# Patient Record
Sex: MALE | Race: OTHER | HISPANIC OR LATINO | URBAN - METROPOLITAN AREA
[De-identification: names, ages, dates, MRNs, and addresses within clinical notes are randomized per-mention and may not be internally consistent; named-entity substitution may affect disease eponyms.]

---

## 2017-04-05 ENCOUNTER — OUTPATIENT (OUTPATIENT)
Dept: OUTPATIENT SERVICES | Facility: HOSPITAL | Age: 52
LOS: 1 days | End: 2017-04-05
Payer: SELF-PAY

## 2017-04-05 PROCEDURE — 80061 LIPID PANEL: CPT

## 2017-04-05 PROCEDURE — 84481 FREE ASSAY (FT-3): CPT

## 2017-04-05 PROCEDURE — 82306 VITAMIN D 25 HYDROXY: CPT

## 2017-04-05 PROCEDURE — 87389 HIV-1 AG W/HIV-1&-2 AB AG IA: CPT

## 2017-04-05 PROCEDURE — 81003 URINALYSIS AUTO W/O SCOPE: CPT

## 2017-04-05 PROCEDURE — 80074 ACUTE HEPATITIS PANEL: CPT

## 2017-04-05 PROCEDURE — 86480 TB TEST CELL IMMUN MEASURE: CPT

## 2017-04-05 PROCEDURE — 83036 HEMOGLOBIN GLYCOSYLATED A1C: CPT

## 2017-04-05 PROCEDURE — 85025 COMPLETE CBC W/AUTO DIFF WBC: CPT

## 2017-04-05 PROCEDURE — 84443 ASSAY THYROID STIM HORMONE: CPT

## 2017-04-05 PROCEDURE — 36415 COLL VENOUS BLD VENIPUNCTURE: CPT

## 2017-04-05 PROCEDURE — 80053 COMPREHEN METABOLIC PANEL: CPT

## 2017-04-05 PROCEDURE — 84439 ASSAY OF FREE THYROXINE: CPT

## 2017-04-12 ENCOUNTER — APPOINTMENT (OUTPATIENT)
Dept: FAMILY MEDICINE | Facility: HOSPITAL | Age: 52
End: 2017-04-12

## 2017-04-12 ENCOUNTER — OUTPATIENT (OUTPATIENT)
Dept: OUTPATIENT SERVICES | Facility: HOSPITAL | Age: 52
LOS: 1 days | End: 2017-04-12
Payer: SELF-PAY

## 2017-04-12 VITALS
OXYGEN SATURATION: 97 % | HEART RATE: 68 BPM | BODY MASS INDEX: 22.71 KG/M2 | WEIGHT: 133 LBS | HEIGHT: 64 IN | DIASTOLIC BLOOD PRESSURE: 76 MMHG | RESPIRATION RATE: 14 BRPM | TEMPERATURE: 97.3 F | SYSTOLIC BLOOD PRESSURE: 120 MMHG

## 2017-04-12 PROCEDURE — 82043 UR ALBUMIN QUANTITATIVE: CPT

## 2017-04-12 PROCEDURE — G0463: CPT

## 2017-04-20 ENCOUNTER — FORM ENCOUNTER (OUTPATIENT)
Age: 52
End: 2017-04-20

## 2017-04-21 ENCOUNTER — OUTPATIENT (OUTPATIENT)
Dept: OUTPATIENT SERVICES | Facility: HOSPITAL | Age: 52
LOS: 1 days | End: 2017-04-21
Payer: SELF-PAY

## 2017-04-21 ENCOUNTER — APPOINTMENT (OUTPATIENT)
Dept: FAMILY MEDICINE | Facility: HOSPITAL | Age: 52
End: 2017-04-21

## 2017-04-21 VITALS
DIASTOLIC BLOOD PRESSURE: 75 MMHG | BODY MASS INDEX: 22.71 KG/M2 | SYSTOLIC BLOOD PRESSURE: 126 MMHG | HEART RATE: 75 BPM | TEMPERATURE: 98.1 F | WEIGHT: 133 LBS | HEIGHT: 64 IN | RESPIRATION RATE: 14 BRPM | OXYGEN SATURATION: 97 %

## 2017-04-21 PROCEDURE — 72100 X-RAY EXAM L-S SPINE 2/3 VWS: CPT

## 2017-04-21 PROCEDURE — G0463: CPT

## 2017-04-21 PROCEDURE — 71046 X-RAY EXAM CHEST 2 VIEWS: CPT

## 2017-04-21 PROCEDURE — 93971 EXTREMITY STUDY: CPT

## 2017-04-21 RX ORDER — LEVOTHYROXINE SODIUM 0.1 MG/1
100 TABLET ORAL DAILY
Qty: 60 | Refills: 0 | Status: DISCONTINUED | COMMUNITY
Start: 2017-04-12 | End: 2017-04-21

## 2017-04-25 ENCOUNTER — LABORATORY RESULT (OUTPATIENT)
Age: 52
End: 2017-04-25

## 2017-06-07 ENCOUNTER — CHART COPY (OUTPATIENT)
Age: 52
End: 2017-06-07

## 2017-06-07 ENCOUNTER — RX RENEWAL (OUTPATIENT)
Age: 52
End: 2017-06-07

## 2017-06-07 RX ORDER — METFORMIN HYDROCHLORIDE 500 MG/1
500 TABLET, COATED ORAL DAILY
Qty: 60 | Refills: 0 | Status: DISCONTINUED | COMMUNITY
Start: 2017-04-12 | End: 2017-06-07

## 2017-06-20 ENCOUNTER — APPOINTMENT (OUTPATIENT)
Dept: FAMILY MEDICINE | Facility: HOSPITAL | Age: 52
End: 2017-06-20

## 2017-06-20 LAB
HBA1C MFR BLD HPLC: 15.2
LDLC SERPL DIRECT ASSAY-MCNC: 143
MICROALBUMIN/CREAT 24H UR-RTO: 145

## 2017-08-07 ENCOUNTER — MEDICATION RENEWAL (OUTPATIENT)
Age: 52
End: 2017-08-07

## 2017-08-07 RX ORDER — ATORVASTATIN CALCIUM 10 MG/1
10 TABLET, FILM COATED ORAL
Qty: 30 | Refills: 3 | Status: DISCONTINUED | COMMUNITY
Start: 2017-04-12 | End: 2017-08-07

## 2017-08-16 ENCOUNTER — MEDICATION RENEWAL (OUTPATIENT)
Age: 52
End: 2017-08-16

## 2017-10-31 ENCOUNTER — MEDICATION RENEWAL (OUTPATIENT)
Age: 52
End: 2017-10-31

## 2018-02-24 ENCOUNTER — EMERGENCY (EMERGENCY)
Facility: HOSPITAL | Age: 53
LOS: 1 days | Discharge: HOME | End: 2018-02-24
Admitting: EMERGENCY MEDICINE
Payer: SELF-PAY

## 2018-02-24 PROCEDURE — 99284 EMERGENCY DEPT VISIT MOD MDM: CPT

## 2018-02-24 PROCEDURE — 99283 EMERGENCY DEPT VISIT LOW MDM: CPT | Mod: 25

## 2018-02-24 PROCEDURE — 99053 MED SERV 10PM-8AM 24 HR FAC: CPT

## 2018-03-15 ENCOUNTER — CHART COPY (OUTPATIENT)
Age: 53
End: 2018-03-15

## 2018-03-15 ENCOUNTER — FORM ENCOUNTER (OUTPATIENT)
Age: 53
End: 2018-03-15

## 2018-03-16 ENCOUNTER — OUTPATIENT (OUTPATIENT)
Dept: OUTPATIENT SERVICES | Facility: HOSPITAL | Age: 53
LOS: 1 days | End: 2018-03-16
Payer: SELF-PAY

## 2018-03-16 ENCOUNTER — APPOINTMENT (OUTPATIENT)
Dept: FAMILY MEDICINE | Facility: HOSPITAL | Age: 53
End: 2018-03-16
Payer: SELF-PAY

## 2018-03-16 VITALS
TEMPERATURE: 97.4 F | DIASTOLIC BLOOD PRESSURE: 86 MMHG | RESPIRATION RATE: 14 BRPM | WEIGHT: 126 LBS | SYSTOLIC BLOOD PRESSURE: 121 MMHG | BODY MASS INDEX: 21.63 KG/M2 | HEART RATE: 62 BPM | OXYGEN SATURATION: 100 %

## 2018-03-16 DIAGNOSIS — Z00.00 ENCOUNTER FOR GENERAL ADULT MEDICAL EXAMINATION WITHOUT ABNORMAL FINDINGS: ICD-10-CM

## 2018-03-16 DIAGNOSIS — E11.9 TYPE 2 DIABETES MELLITUS WITHOUT COMPLICATIONS: ICD-10-CM

## 2018-03-16 PROCEDURE — 86480 TB TEST CELL IMMUN MEASURE: CPT

## 2018-03-16 PROCEDURE — 80053 COMPREHEN METABOLIC PANEL: CPT

## 2018-03-16 PROCEDURE — 71046 X-RAY EXAM CHEST 2 VIEWS: CPT

## 2018-03-16 PROCEDURE — 85025 COMPLETE CBC W/AUTO DIFF WBC: CPT

## 2018-03-16 PROCEDURE — 36415 COLL VENOUS BLD VENIPUNCTURE: CPT

## 2018-03-16 PROCEDURE — 84443 ASSAY THYROID STIM HORMONE: CPT

## 2018-03-16 PROCEDURE — 80061 LIPID PANEL: CPT

## 2018-03-16 PROCEDURE — 86140 C-REACTIVE PROTEIN: CPT

## 2018-03-16 PROCEDURE — G0103: CPT

## 2018-03-16 PROCEDURE — 71046 X-RAY EXAM CHEST 2 VIEWS: CPT | Mod: 26

## 2018-03-16 PROCEDURE — G0463: CPT

## 2018-03-16 PROCEDURE — 83036 HEMOGLOBIN GLYCOSYLATED A1C: CPT

## 2018-03-16 PROCEDURE — 82043 UR ALBUMIN QUANTITATIVE: CPT

## 2018-03-20 LAB
HBA1C MFR BLD HPLC: 14.3
LDLC SERPL DIRECT ASSAY-MCNC: 94
MICROALBUMIN/CREAT 24H UR-RTO: 20

## 2018-03-22 ENCOUNTER — APPOINTMENT (OUTPATIENT)
Dept: FAMILY MEDICINE | Facility: HOSPITAL | Age: 53
End: 2018-03-22

## 2018-03-23 ENCOUNTER — MESSAGE (OUTPATIENT)
Age: 53
End: 2018-03-23

## 2018-03-26 ENCOUNTER — APPOINTMENT (OUTPATIENT)
Dept: FAMILY MEDICINE | Facility: HOSPITAL | Age: 53
End: 2018-03-26

## 2018-04-02 ENCOUNTER — OTHER (OUTPATIENT)
Age: 53
End: 2018-04-02

## 2018-04-05 ENCOUNTER — APPOINTMENT (OUTPATIENT)
Dept: PODIATRY | Facility: HOSPITAL | Age: 53
End: 2018-04-05

## 2019-01-17 ENCOUNTER — APPOINTMENT (OUTPATIENT)
Age: 54
End: 2019-01-17

## 2019-01-17 ENCOUNTER — MED ADMIN CHARGE (OUTPATIENT)
Age: 54
End: 2019-01-17

## 2019-01-17 ENCOUNTER — OUTPATIENT (OUTPATIENT)
Dept: OUTPATIENT SERVICES | Facility: HOSPITAL | Age: 54
LOS: 1 days | End: 2019-01-17
Payer: SELF-PAY

## 2019-01-17 ENCOUNTER — RESULT CHARGE (OUTPATIENT)
Age: 54
End: 2019-01-17

## 2019-01-17 VITALS
OXYGEN SATURATION: 99 % | DIASTOLIC BLOOD PRESSURE: 50 MMHG | BODY MASS INDEX: 21.46 KG/M2 | SYSTOLIC BLOOD PRESSURE: 91 MMHG | HEART RATE: 68 BPM | TEMPERATURE: 97.4 F | WEIGHT: 125 LBS | RESPIRATION RATE: 15 BRPM

## 2019-01-17 DIAGNOSIS — Z00.00 ENCOUNTER FOR GENERAL ADULT MEDICAL EXAMINATION WITHOUT ABNORMAL FINDINGS: ICD-10-CM

## 2019-01-17 LAB — HBA1C MFR BLD HPLC: 14

## 2019-01-17 PROCEDURE — G0463: CPT

## 2019-01-17 PROCEDURE — G0008: CPT

## 2019-01-17 RX ORDER — ATORVASTATIN CALCIUM 10 MG/1
10 TABLET, FILM COATED ORAL
Qty: 90 | Refills: 3 | Status: DISCONTINUED | COMMUNITY
Start: 2017-08-07 | End: 2019-01-17

## 2019-01-17 RX ORDER — METFORMIN HYDROCHLORIDE 500 MG/1
500 TABLET, COATED ORAL
Qty: 120 | Refills: 1 | Status: DISCONTINUED | COMMUNITY
Start: 2017-06-07 | End: 2019-01-17

## 2019-01-17 RX ORDER — LISINOPRIL 2.5 MG/1
2.5 TABLET ORAL DAILY
Qty: 60 | Refills: 0 | Status: DISCONTINUED | COMMUNITY
Start: 2017-04-21 | End: 2019-01-17

## 2019-01-17 RX ORDER — ASPIRIN ENTERIC COATED TABLETS 81 MG 81 MG/1
81 TABLET, DELAYED RELEASE ORAL DAILY
Qty: 1 | Refills: 1 | Status: DISCONTINUED | COMMUNITY
Start: 2017-04-21 | End: 2019-01-17

## 2019-01-17 RX ORDER — IBUPROFEN 200 MG/1
200 TABLET, FILM COATED ORAL EVERY 8 HOURS
Qty: 24 | Refills: 0 | Status: DISCONTINUED | COMMUNITY
Start: 2017-04-12 | End: 2019-01-17

## 2019-01-17 NOTE — ASSESSMENT
[FreeTextEntry1] : 53M comes in for f/u DM.\par \par 1. Uncontrolled DM\par POCT Hba1c\par f/u umicro and lipids\par PPSV vaccine given\par Referred pt to ophthalmology, podiatry, and DM educator\par Sent lisinopril, metformin, and atorvastatin to pharmacy and provided Good Rx discount cards\par \par 2. Varicose veins\par Prescribed compression stockings\par \par 3. Health maintenance\par Flu vaccine given\par f/u CBC, CMP\par Will give FIT testing at next visit\par \par 4. Depression\par Scored 17 on PHQ 9 - will refer to SW at next visit\par He is not acutely suicidal, told him if he develops these feelings he must call 911 immediately.\par \par 5. Poor vision\par Snellen test showed he is 20/200 with both eyes individually and together\par Referred to ophthalmology\par \par RTC in 1 week for f/u DM and depression\par \par Discussed w/ Dr. Green

## 2019-01-17 NOTE — COUNSELING
[Weight management counseling provided] : Weight management [Healthy eating counseling provided] : healthy eating [Disease Management counseling provided] : disease management  [Walking] : Walking [Decrease Portions] : Decrease food portions [Take medications as directed] : Take medications as directed [Check sugar ___ times per week] : Check blood sugar [unfilled]  times per week [Check feet daily] : Check feet daily [Keep FS  log to bring to next visit] : Keep finger stick log and  bring  to next visit

## 2019-01-17 NOTE — HISTORY OF PRESENT ILLNESS
[Diabetes Mellitus] : Diabetes Mellitus [Patient was last seen on ___] : Patient was last seen on [unfilled] [[employer] ] : provided by [employer]   [# of episodes ___] : Reports [unfilled] hypoglycemic episodes since the last visit. [Does not check] : Patient does not check blood glucose regularly [Most Recent A1C: ___] : Most recent A1C was [unfilled] [Target goal not met] : A1C target goal not met [Neuropathy] :  neuropathy [FreeTextEntry6] : 53M comes in for f/u DM. Last A1c was 14.3 March 2018. Repeat POCT Hba1c today showed. Doesn't check his sugars at home. He says the doctors previously told him to use insulin but he isn't using it and instead is using natural products (capsules?). Has poor appetite and reports he is losing weight. He says he never used insulin. He isn't taking metformin either because he is taking "the natural product". He also reports he has not been taking any of the other prescribed PO meds because he can't afford them. He said he would take them if he could afford them.\par \par Diet: salads, non-fatty foods, avoiding sweets [FreeTextEntry1] : [id number] [de-identified] : Has had numbness/tingling of feet for ~1 year.

## 2019-01-17 NOTE — PHYSICAL EXAM
[No Acute Distress] : no acute distress [Well Nourished] : well nourished [Well Developed] : well developed [Well-Appearing] : well-appearing [No JVD] : no jugular venous distention [Supple] : supple [No Lymphadenopathy] : no lymphadenopathy [Thyroid Normal, No Nodules] : the thyroid was normal and there were no nodules present [No Respiratory Distress] : no respiratory distress  [Clear to Auscultation] : lungs were clear to auscultation bilaterally [Normal Rate] : normal rate  [Regular Rhythm] : with a regular rhythm [Normal S1, S2] : normal S1 and S2 [No Murmur] : no murmur heard [Pedal Pulses Present] : the pedal pulses are present [No Joint Swelling] : no joint swelling [Normal Gait] : normal gait [Coordination Grossly Intact] : coordination grossly intact [Right Foot Was Examined] : Right foot ~C was examined [Left Foot Was Examined] : left foot ~C was examined [None] : no ulcers in either foot were found [] : both feet [___] : left foot points [unfilled] [de-identified] : + mole in between 1st and 2nd toe LLE

## 2019-01-18 ENCOUNTER — EMERGENCY (EMERGENCY)
Facility: HOSPITAL | Age: 54
LOS: 1 days | Discharge: HOME | End: 2019-01-18
Attending: INTERNAL MEDICINE | Admitting: INTERNAL MEDICINE
Payer: MEDICAID

## 2019-01-18 ENCOUNTER — OUTPATIENT (OUTPATIENT)
Dept: OUTPATIENT SERVICES | Facility: HOSPITAL | Age: 54
LOS: 1 days | End: 2019-01-18
Payer: SELF-PAY

## 2019-01-18 ENCOUNTER — APPOINTMENT (OUTPATIENT)
Age: 54
End: 2019-01-18

## 2019-01-18 VITALS
TEMPERATURE: 98 F | SYSTOLIC BLOOD PRESSURE: 100 MMHG | DIASTOLIC BLOOD PRESSURE: 60 MMHG | HEART RATE: 60 BPM | RESPIRATION RATE: 17 BRPM | OXYGEN SATURATION: 96 %

## 2019-01-18 VITALS
OXYGEN SATURATION: 100 % | SYSTOLIC BLOOD PRESSURE: 100 MMHG | WEIGHT: 123.9 LBS | RESPIRATION RATE: 17 BRPM | DIASTOLIC BLOOD PRESSURE: 60 MMHG | HEIGHT: 64.57 IN | TEMPERATURE: 98 F | HEART RATE: 76 BPM

## 2019-01-18 VITALS
TEMPERATURE: 97.6 F | WEIGHT: 130 LBS | SYSTOLIC BLOOD PRESSURE: 89 MMHG | OXYGEN SATURATION: 100 % | HEART RATE: 56 BPM | RESPIRATION RATE: 15 BRPM | BODY MASS INDEX: 22.31 KG/M2 | DIASTOLIC BLOOD PRESSURE: 57 MMHG

## 2019-01-18 DIAGNOSIS — E11.65 TYPE 2 DIABETES MELLITUS WITH HYPERGLYCEMIA: ICD-10-CM

## 2019-01-18 DIAGNOSIS — Z00.00 ENCOUNTER FOR GENERAL ADULT MEDICAL EXAMINATION WITHOUT ABNORMAL FINDINGS: ICD-10-CM

## 2019-01-18 DIAGNOSIS — Z23 ENCOUNTER FOR IMMUNIZATION: ICD-10-CM

## 2019-01-18 DIAGNOSIS — R73.9 HYPERGLYCEMIA, UNSPECIFIED: ICD-10-CM

## 2019-01-18 LAB
ACETONE SERPL-MCNC: NEGATIVE — SIGNIFICANT CHANGE UP
ALBUMIN SERPL ELPH-MCNC: 3.3 G/DL — SIGNIFICANT CHANGE UP (ref 3.3–5)
ALBUMIN SERPL ELPH-MCNC: 4.3 G/DL
ALP BLD-CCNC: 71 U/L
ALP SERPL-CCNC: 76 U/L — SIGNIFICANT CHANGE UP (ref 40–120)
ALT FLD-CCNC: 16 U/L DA — SIGNIFICANT CHANGE UP (ref 10–45)
ALT SERPL-CCNC: 14 U/L
ANION GAP SERPL CALC-SCNC: 14 MMOL/L
ANION GAP SERPL CALC-SCNC: 8 MMOL/L — SIGNIFICANT CHANGE UP (ref 5–17)
APTT BLD: 27.6 SEC — SIGNIFICANT CHANGE UP (ref 27.5–36.3)
AST SERPL-CCNC: 11 U/L — SIGNIFICANT CHANGE UP (ref 10–40)
AST SERPL-CCNC: 16 U/L
BASOPHILS # BLD AUTO: 0 K/UL — SIGNIFICANT CHANGE UP (ref 0–0.2)
BASOPHILS # BLD AUTO: 0.02 K/UL
BASOPHILS NFR BLD AUTO: 0.5 %
BASOPHILS NFR BLD AUTO: 0.8 % — SIGNIFICANT CHANGE UP (ref 0–2)
BILIRUB SERPL-MCNC: 0.6 MG/DL
BILIRUB SERPL-MCNC: 0.8 MG/DL — SIGNIFICANT CHANGE UP (ref 0.2–1.2)
BUN SERPL-MCNC: 13 MG/DL
BUN SERPL-MCNC: 13 MG/DL — SIGNIFICANT CHANGE UP (ref 7–23)
CALCIUM SERPL-MCNC: 8.3 MG/DL — LOW (ref 8.4–10.5)
CALCIUM SERPL-MCNC: 9.6 MG/DL
CHLORIDE SERPL-SCNC: 95 MMOL/L
CHLORIDE SERPL-SCNC: 99 MMOL/L — SIGNIFICANT CHANGE UP (ref 96–108)
CHOLEST SERPL-MCNC: 226 MG/DL
CHOLEST/HDLC SERPL: 4.8 RATIO
CO2 BLDCOV-SCNC: 10 MMOL/L — LOW (ref 22–30)
CO2 SERPL-SCNC: 24 MMOL/L
CO2 SERPL-SCNC: 27 MMOL/L — SIGNIFICANT CHANGE UP (ref 22–31)
CREAT SERPL-MCNC: 0.78 MG/DL — SIGNIFICANT CHANGE UP (ref 0.5–1.3)
CREAT SERPL-MCNC: 0.84 MG/DL
CREAT SPEC-SCNC: 35 MG/DL
EOSINOPHIL # BLD AUTO: 0.1 K/UL — SIGNIFICANT CHANGE UP (ref 0–0.5)
EOSINOPHIL # BLD AUTO: 0.22 K/UL
EOSINOPHIL NFR BLD AUTO: 3 % — SIGNIFICANT CHANGE UP (ref 0–6)
EOSINOPHIL NFR BLD AUTO: 5.3 %
GAS PNL BLDCOV: 7.36 — SIGNIFICANT CHANGE UP (ref 7.25–7.45)
GLUCOSE BLDC GLUCOMTR-MCNC: 299 MG/DL — HIGH (ref 70–99)
GLUCOSE BLDC GLUCOMTR-MCNC: 338 MG/DL — HIGH (ref 70–99)
GLUCOSE BLDC GLUCOMTR-MCNC: 346 MG/DL — HIGH (ref 70–99)
GLUCOSE SERPL-MCNC: 405 MG/DL — HIGH (ref 70–99)
GLUCOSE SERPL-MCNC: 502 MG/DL
HCT VFR BLD CALC: 42.4 % — SIGNIFICANT CHANGE UP (ref 39–50)
HCT VFR BLD CALC: 44 %
HDLC SERPL-MCNC: 47 MG/DL
HGB BLD-MCNC: 14.9 G/DL — SIGNIFICANT CHANGE UP (ref 13–17)
HGB BLD-MCNC: 15.2 G/DL
IMM GRANULOCYTES NFR BLD AUTO: 0 %
INR BLD: 1.03 RATIO — SIGNIFICANT CHANGE UP (ref 0.88–1.16)
LDLC SERPL CALC-MCNC: 129 MG/DL
LYMPHOCYTES # BLD AUTO: 1.1 K/UL — SIGNIFICANT CHANGE UP (ref 1–3.3)
LYMPHOCYTES # BLD AUTO: 1.75 K/UL
LYMPHOCYTES # BLD AUTO: 21.9 % — SIGNIFICANT CHANGE UP (ref 13–44)
LYMPHOCYTES NFR BLD AUTO: 42.1 %
MAN DIFF?: NORMAL
MCHC RBC-ENTMCNC: 30.9 PG
MCHC RBC-ENTMCNC: 31.4 PG — SIGNIFICANT CHANGE UP (ref 27–34)
MCHC RBC-ENTMCNC: 34.5 GM/DL
MCHC RBC-ENTMCNC: 35.3 GM/DL — SIGNIFICANT CHANGE UP (ref 32–36)
MCV RBC AUTO: 89.2 FL — SIGNIFICANT CHANGE UP (ref 80–100)
MCV RBC AUTO: 89.4 FL
MICROALBUMIN 24H UR DL<=1MG/L-MCNC: <1.2 MG/DL
MICROALBUMIN/CREAT 24H UR-RTO: NORMAL
MONOCYTES # BLD AUTO: 0.3 K/UL
MONOCYTES # BLD AUTO: 0.4 K/UL — SIGNIFICANT CHANGE UP (ref 0–0.9)
MONOCYTES NFR BLD AUTO: 7.2 %
MONOCYTES NFR BLD AUTO: 8.3 % — SIGNIFICANT CHANGE UP (ref 1–9)
NEUTROPHILS # BLD AUTO: 1.87 K/UL
NEUTROPHILS # BLD AUTO: 3.2 K/UL — SIGNIFICANT CHANGE UP (ref 1.8–7.4)
NEUTROPHILS NFR BLD AUTO: 44.9 %
NEUTROPHILS NFR BLD AUTO: 66 % — SIGNIFICANT CHANGE UP (ref 43–77)
PCO2 BLDCOV: 18 MMHG — LOW (ref 27–49)
PLATELET # BLD AUTO: 179 K/UL — SIGNIFICANT CHANGE UP (ref 150–400)
PLATELET # BLD AUTO: 229 K/UL
PO2 BLDCOA: 55 MMHG — HIGH (ref 17–41)
POTASSIUM SERPL-MCNC: 4 MMOL/L — SIGNIFICANT CHANGE UP (ref 3.5–5.3)
POTASSIUM SERPL-SCNC: 4 MMOL/L — SIGNIFICANT CHANGE UP (ref 3.5–5.3)
POTASSIUM SERPL-SCNC: 4.7 MMOL/L
PROT SERPL-MCNC: 6.3 G/DL — SIGNIFICANT CHANGE UP (ref 6–8.3)
PROT SERPL-MCNC: 7.1 G/DL
PROTHROM AB SERPL-ACNC: 11.5 SEC — SIGNIFICANT CHANGE UP (ref 10–12.9)
RBC # BLD: 4.75 M/UL — SIGNIFICANT CHANGE UP (ref 4.2–5.8)
RBC # BLD: 4.92 M/UL
RBC # FLD: 10.8 % — SIGNIFICANT CHANGE UP (ref 10.3–14.5)
RBC # FLD: 12.7 %
SAO2 % BLDCOV: 89 % — HIGH (ref 20–75)
SODIUM SERPL-SCNC: 133 MMOL/L
SODIUM SERPL-SCNC: 134 MMOL/L — LOW (ref 135–145)
TRIGL SERPL-MCNC: 248 MG/DL
WBC # BLD: 4.9 K/UL — SIGNIFICANT CHANGE UP (ref 3.8–10.5)
WBC # FLD AUTO: 4.16 K/UL
WBC # FLD AUTO: 4.9 K/UL — SIGNIFICANT CHANGE UP (ref 3.8–10.5)

## 2019-01-18 PROCEDURE — 80053 COMPREHEN METABOLIC PANEL: CPT

## 2019-01-18 PROCEDURE — 93005 ELECTROCARDIOGRAM TRACING: CPT

## 2019-01-18 PROCEDURE — 71045 X-RAY EXAM CHEST 1 VIEW: CPT | Mod: 26

## 2019-01-18 PROCEDURE — 85027 COMPLETE CBC AUTOMATED: CPT

## 2019-01-18 PROCEDURE — 85610 PROTHROMBIN TIME: CPT

## 2019-01-18 PROCEDURE — 96361 HYDRATE IV INFUSION ADD-ON: CPT

## 2019-01-18 PROCEDURE — 96374 THER/PROPH/DIAG INJ IV PUSH: CPT

## 2019-01-18 PROCEDURE — G0008: CPT

## 2019-01-18 PROCEDURE — 82009 KETONE BODYS QUAL: CPT

## 2019-01-18 PROCEDURE — 82803 BLOOD GASES ANY COMBINATION: CPT

## 2019-01-18 PROCEDURE — 82043 UR ALBUMIN QUANTITATIVE: CPT

## 2019-01-18 PROCEDURE — 99284 EMERGENCY DEPT VISIT MOD MDM: CPT | Mod: 25

## 2019-01-18 PROCEDURE — G0463: CPT

## 2019-01-18 PROCEDURE — 71045 X-RAY EXAM CHEST 1 VIEW: CPT

## 2019-01-18 PROCEDURE — 82962 GLUCOSE BLOOD TEST: CPT

## 2019-01-18 PROCEDURE — 93010 ELECTROCARDIOGRAM REPORT: CPT

## 2019-01-18 PROCEDURE — 80061 LIPID PANEL: CPT

## 2019-01-18 PROCEDURE — 85730 THROMBOPLASTIN TIME PARTIAL: CPT

## 2019-01-18 PROCEDURE — 99284 EMERGENCY DEPT VISIT MOD MDM: CPT

## 2019-01-18 RX ORDER — INSULIN HUMAN 100 [IU]/ML
6 INJECTION, SOLUTION SUBCUTANEOUS ONCE
Qty: 0 | Refills: 0 | Status: COMPLETED | OUTPATIENT
Start: 2019-01-18 | End: 2019-01-18

## 2019-01-18 RX ORDER — SODIUM CHLORIDE 9 MG/ML
1000 INJECTION INTRAMUSCULAR; INTRAVENOUS; SUBCUTANEOUS ONCE
Qty: 0 | Refills: 0 | Status: COMPLETED | OUTPATIENT
Start: 2019-01-18 | End: 2019-01-18

## 2019-01-18 RX ORDER — SODIUM CHLORIDE 9 MG/ML
3 INJECTION INTRAMUSCULAR; INTRAVENOUS; SUBCUTANEOUS ONCE
Qty: 0 | Refills: 0 | Status: COMPLETED | OUTPATIENT
Start: 2019-01-18 | End: 2019-01-18

## 2019-01-18 RX ORDER — SODIUM CHLORIDE 9 MG/ML
1000 INJECTION INTRAMUSCULAR; INTRAVENOUS; SUBCUTANEOUS
Qty: 0 | Refills: 0 | Status: DISCONTINUED | OUTPATIENT
Start: 2019-01-18 | End: 2019-01-22

## 2019-01-18 RX ADMIN — SODIUM CHLORIDE 1000 MILLILITER(S): 9 INJECTION INTRAMUSCULAR; INTRAVENOUS; SUBCUTANEOUS at 12:21

## 2019-01-18 RX ADMIN — SODIUM CHLORIDE 3 MILLILITER(S): 9 INJECTION INTRAMUSCULAR; INTRAVENOUS; SUBCUTANEOUS at 11:28

## 2019-01-18 RX ADMIN — INSULIN HUMAN 6 UNIT(S): 100 INJECTION, SOLUTION SUBCUTANEOUS at 13:15

## 2019-01-18 RX ADMIN — SODIUM CHLORIDE 1000 MILLILITER(S): 9 INJECTION INTRAMUSCULAR; INTRAVENOUS; SUBCUTANEOUS at 11:31

## 2019-01-18 RX ADMIN — SODIUM CHLORIDE 1000 MILLILITER(S): 9 INJECTION INTRAMUSCULAR; INTRAVENOUS; SUBCUTANEOUS at 13:33

## 2019-01-18 RX ADMIN — SODIUM CHLORIDE 1000 MILLILITER(S): 9 INJECTION INTRAMUSCULAR; INTRAVENOUS; SUBCUTANEOUS at 12:31

## 2019-01-18 NOTE — ED PROVIDER NOTE - PHYSICAL EXAMINATION
General:     NAD, well-nourished, well-appearing  Head:     NC/AT, EOMI, oral mucosa dry  Neck:     trachea midline  Lungs:     CTA b/l, no w/r/r  CVS:     S1S2, RRR, no m/g/r  Abd:     +BS, s/nt/nd, no organomegaly  Ext:    2+ radial and pedal pulses, no c/c/e  Neuro: AAOx3, no sensory/motor deficits

## 2019-01-18 NOTE — HISTORY OF PRESENT ILLNESS
[FreeTextEntry8] : 54 YO M with PMHx of DM2, uncontrolled, presenting post ER visit for hyperglycemia of >500.  patient received 6 units of insulin and was discharged from the ED once his blood glucose was <300.  Patient would like to start taking insulin.  He is concerned about his disease process. \par  [[employer] ] : provided by [employer]   [FreeTextEntry1] : [id number]

## 2019-01-18 NOTE — PROVIDER CONTACT NOTE (OTHER) - ACTION/TREATMENT ORDERED:
Dr Castro aware of latest BS.  He contacted Family Practice , who will come to bedside and have a provider educate him regarding insulin usage/Diabetes
Dr Castro to continue to monitor
As ordered infuse 2L NS, and then repeat BS, no further interventions at this time

## 2019-01-18 NOTE — ED ADULT NURSE NOTE - OBJECTIVE STATEMENT
As per pt, went to Doctor, 1/17/19, and had blood drawn, after c/o weakness.  received call this am that glucose in BS was "high", and advised to go to ED

## 2019-01-18 NOTE — ASSESSMENT
[FreeTextEntry1] : #DM2\par - A1C: 14.0 (1/17/19)\par - Patient was sent to the ED for glucose >500.\par - Patient is taking Metformin which was started yesterday\par - Would like to start insulin\par - Novolin 70/30, syringes, needles sent to [facility] with coupons\par - Patient to follow up with Diabetic Educator on Tuesday 1/22/19\par - Patient to get glucose meter from Diabetic Educator\par - Patient has poor vision but is able to reach close\par - Avoid hyperglycemic foods\par - Drink lots of water\par - Go to ED if: headaches, visual changes, polyuria, confusion.\par \par RTC in 1 week for DM2 follow up\par \par D/W Dr. El

## 2019-01-22 ENCOUNTER — APPOINTMENT (OUTPATIENT)
Dept: FAMILY MEDICINE | Facility: HOSPITAL | Age: 54
End: 2019-01-22

## 2019-01-23 DIAGNOSIS — E11.9 TYPE 2 DIABETES MELLITUS WITHOUT COMPLICATIONS: ICD-10-CM

## 2019-01-25 ENCOUNTER — EMERGENCY (EMERGENCY)
Facility: HOSPITAL | Age: 54
LOS: 1 days | Discharge: HOME | End: 2019-01-25
Attending: EMERGENCY MEDICINE | Admitting: EMERGENCY MEDICINE
Payer: MEDICAID

## 2019-01-25 ENCOUNTER — APPOINTMENT (OUTPATIENT)
Age: 54
End: 2019-01-25

## 2019-01-25 ENCOUNTER — RESULT CHARGE (OUTPATIENT)
Age: 54
End: 2019-01-25

## 2019-01-25 ENCOUNTER — OUTPATIENT (OUTPATIENT)
Dept: OUTPATIENT SERVICES | Facility: HOSPITAL | Age: 54
LOS: 1 days | End: 2019-01-25
Payer: SELF-PAY

## 2019-01-25 VITALS
HEART RATE: 59 BPM | OXYGEN SATURATION: 98 % | RESPIRATION RATE: 16 BRPM | DIASTOLIC BLOOD PRESSURE: 64 MMHG | SYSTOLIC BLOOD PRESSURE: 110 MMHG

## 2019-01-25 VITALS
OXYGEN SATURATION: 100 % | HEART RATE: 57 BPM | SYSTOLIC BLOOD PRESSURE: 108 MMHG | RESPIRATION RATE: 18 BRPM | DIASTOLIC BLOOD PRESSURE: 70 MMHG | HEIGHT: 64 IN | TEMPERATURE: 97 F | WEIGHT: 115.08 LBS

## 2019-01-25 DIAGNOSIS — Z00.00 ENCOUNTER FOR GENERAL ADULT MEDICAL EXAMINATION WITHOUT ABNORMAL FINDINGS: ICD-10-CM

## 2019-01-25 PROBLEM — E78.00 PURE HYPERCHOLESTEROLEMIA, UNSPECIFIED: Chronic | Status: ACTIVE | Noted: 2019-01-18

## 2019-01-25 LAB — GLUCOSE BLDC GLUCOMTR-MCNC: 310 MG/DL — HIGH (ref 70–99)

## 2019-01-25 PROCEDURE — 99283 EMERGENCY DEPT VISIT LOW MDM: CPT

## 2019-01-25 PROCEDURE — 82043 UR ALBUMIN QUANTITATIVE: CPT

## 2019-01-25 PROCEDURE — G0463: CPT

## 2019-01-25 PROCEDURE — 82962 GLUCOSE BLOOD TEST: CPT

## 2019-01-25 PROCEDURE — 82274 ASSAY TEST FOR BLOOD FECAL: CPT
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
## 2019-01-25 NOTE — ED PROVIDER NOTE - NSFOLLOWUPINSTRUCTIONS_ED_ALL_ED_FT
Follow up with the FP clinic 24-48 hours.  Take all of your medications as prescribed  Any worsening of symptoms or new concerning symptoms return to the ED

## 2019-01-25 NOTE — ED ADULT NURSE NOTE - NSIMPLEMENTINTERV_GEN_ALL_ED
Implemented All Universal Safety Interventions:  [place] to call system. Call bell, personal items and telephone within reach. Instruct patient to call for assistance. Room bathroom lighting operational. Non-slip footwear when patient is off stretcher. Physically safe environment: no spills, clutter or unnecessary equipment. Stretcher in lowest position, wheels locked, appropriate side rails in place.

## 2019-01-25 NOTE — ED ADULT NURSE NOTE - OBJECTIVE STATEMENT
Pt presents A&Ox3 with c/c of feeling depressed on and off x few weeks.   States also has been having extremely elevated blood sugar lately, has been treated recently in this ED for it.   went to Family Practice to have his diabetes routinely evaluated but while was being assessed and questioned about feeling depressed/suicidal he admitted to have been feeling depressed on and off with suicidal ideation, denies being depressed or feeling suicidal today.   Today denies dizziness, SOB, CP, n/v/d/c.  Pt is calm and compliant.  Placed in consult room, undressed, clothing collected and placed in storage room.

## 2019-01-25 NOTE — ED PROVIDER NOTE - ATTENDING CONTRIBUTION TO CARE
Keegan with [facility]. The patient is denying suicidality. I discussed with resident who said that he said he was suicidal while there. If the patient is denying such thoughts at this time and is future oriented (says he has appointment next week and wants to improve his DM control), there is no need for psychiatric evaluation emergently. Plan to check FS. Apparently his A1C is 13%. Patient made aware of sx for return to ED prn. I performed a face to face bedside interview with patient regarding history of present illness, review of symptoms and past medical history. I completed an independent physical exam.  I have discussed the patient's plan of care with Physician Assistant (PA). I agree with note as stated above, having amended the EMR as needed to reflect my findings.   This includes History of Present Illness, HIV, Past Medical/Surgical/Family/Social History, Allergies and Home Medications, Review of Systems, Physical Exam, and any Progress Notes during the time I functioned as the attending physician for this patient.

## 2019-01-25 NOTE — PLAN
[FreeTextEntry1] : - showed him how to inject insulin\par - gave him glucometer and test strips\par - gave him 1/3 HBV vaccine--> will recall him in 1 month for 2/3\par - again gave him ophthalmology, podiatry and diabetic educator referral \par - will refer the patient to the ED for further management of severe depression.
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
## 2019-01-25 NOTE — ED PROVIDER NOTE - PHYSICAL EXAMINATION
General:     NAD, well-nourished, well-appearing  Eyes: PERRL  Head:     NC/AT, EOMI, oral mucosa moist  Neck:     trachea midline  Lungs:     CTA b/l  CVS:     RRR  Abd:     +BS, s/nt/nd  Ext:   no deformities   Neuro: AAOx3, no sensory/motor deficits

## 2019-01-25 NOTE — ED ADULT NURSE REASSESSMENT NOTE - NS ED NURSE REASSESS COMMENT FT1
1:1 was verbally d/c'd by provider but delayed d/c in [place] because provider had an emergency.   Pt did not need to be on 1:1 as pt denied SI on arrival to ED.

## 2019-01-25 NOTE — PHYSICAL EXAM
[No Acute Distress] : no acute distress [Cachectic] : cachexia was observed [Normal Sclera/Conjunctiva] : normal sclera/conjunctiva [PERRL] : pupils equal round and reactive to light [Normal Outer Ear/Nose] : the outer ears and nose were normal in appearance [No JVD] : no jugular venous distention [No Respiratory Distress] : no respiratory distress  [Clear to Auscultation] : lungs were clear to auscultation bilaterally [No Accessory Muscle Use] : no accessory muscle use [Normal Rate] : normal rate  [Regular Rhythm] : with a regular rhythm [Normal S1, S2] : normal S1 and S2 [No Edema] : there was no peripheral edema [Soft] : abdomen soft [Non Tender] : non-tender [Non-distended] : non-distended [No CVA Tenderness] : no CVA  tenderness [No Joint Swelling] : no joint swelling [No Rash] : no rash [Normal Gait] : normal gait [de-identified] : p

## 2019-01-25 NOTE — ED PROVIDER NOTE - MEDICAL DECISION MAKING DETAILS
pt known diabetic with hypoglycemia. no suicidal ideations. has future plans and f/u car scheduled. will f/u FP clinic
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
## 2019-01-28 DIAGNOSIS — E11.65 TYPE 2 DIABETES MELLITUS WITH HYPERGLYCEMIA: ICD-10-CM

## 2019-02-01 ENCOUNTER — RESULT CHARGE (OUTPATIENT)
Age: 54
End: 2019-02-01

## 2019-02-01 ENCOUNTER — APPOINTMENT (OUTPATIENT)
Age: 54
End: 2019-02-01

## 2019-02-01 ENCOUNTER — OUTPATIENT (OUTPATIENT)
Dept: OUTPATIENT SERVICES | Facility: HOSPITAL | Age: 54
LOS: 1 days | End: 2019-02-01
Payer: SELF-PAY

## 2019-02-01 ENCOUNTER — OTHER (OUTPATIENT)
Age: 54
End: 2019-02-01

## 2019-02-01 VITALS
BODY MASS INDEX: 21.85 KG/M2 | RESPIRATION RATE: 16 BRPM | HEART RATE: 65 BPM | HEIGHT: 64 IN | OXYGEN SATURATION: 100 % | DIASTOLIC BLOOD PRESSURE: 63 MMHG | SYSTOLIC BLOOD PRESSURE: 98 MMHG | WEIGHT: 128 LBS | TEMPERATURE: 98 F

## 2019-02-01 DIAGNOSIS — Z00.00 ENCOUNTER FOR GENERAL ADULT MEDICAL EXAMINATION WITHOUT ABNORMAL FINDINGS: ICD-10-CM

## 2019-02-01 PROCEDURE — G0463: CPT

## 2019-02-01 NOTE — HISTORY OF PRESENT ILLNESS
[FreeTextEntry1] : DM Check up [de-identified] : Patient recently seen for DM check, A1C 14.3, was instructed to take insulin, was educated as to how he can administer. Patient has hx of non-compliance and severe depression. Today patient returns for glucose check. Case dw Lily Shahid, patient re-educated, teachback as to how he uses his medicines, and his diet and exercise regimen. Patient is eating well, using insulin twice a day, BGM in the room showed 147 and 155. Patient is motivated, no longer feeling depressed today. Patient with financial issues to pay for medications at the pharmacy, will given him GoodRx coupons as possible.

## 2019-02-04 DIAGNOSIS — E11.65 TYPE 2 DIABETES MELLITUS WITH HYPERGLYCEMIA: ICD-10-CM

## 2019-02-05 LAB — GLUCOSE BLDC GLUCOMTR-MCNC: 143

## 2019-02-11 LAB
CREAT SPEC-SCNC: 94 MG/DL
HBA1C MFR BLD HPLC: 14
HEMOCCULT STL QL IA: NEGATIVE
MICROALBUMIN 24H UR DL<=1MG/L-MCNC: <1.2 MG/DL
MICROALBUMIN/CREAT 24H UR-RTO: NORMAL

## 2019-02-15 ENCOUNTER — OUTPATIENT (OUTPATIENT)
Dept: OUTPATIENT SERVICES | Facility: HOSPITAL | Age: 54
LOS: 1 days | End: 2019-02-15
Payer: SELF-PAY

## 2019-02-15 ENCOUNTER — APPOINTMENT (OUTPATIENT)
Age: 54
End: 2019-02-15

## 2019-02-15 ENCOUNTER — OUTPATIENT (OUTPATIENT)
Dept: OUTPATIENT SERVICES | Facility: HOSPITAL | Age: 54
LOS: 1 days | End: 2019-02-15

## 2019-02-15 VITALS
WEIGHT: 133 LBS | OXYGEN SATURATION: 99 % | BODY MASS INDEX: 22.83 KG/M2 | DIASTOLIC BLOOD PRESSURE: 60 MMHG | HEART RATE: 61 BPM | SYSTOLIC BLOOD PRESSURE: 94 MMHG | RESPIRATION RATE: 15 BRPM | TEMPERATURE: 97.2 F

## 2019-02-15 DIAGNOSIS — E11.65 TYPE 2 DIABETES MELLITUS WITH HYPERGLYCEMIA: ICD-10-CM

## 2019-02-15 DIAGNOSIS — Z00.00 ENCOUNTER FOR GENERAL ADULT MEDICAL EXAMINATION WITHOUT ABNORMAL FINDINGS: ICD-10-CM

## 2019-02-15 PROCEDURE — G0108: CPT

## 2019-02-15 PROCEDURE — G0463: CPT

## 2019-02-15 NOTE — PLAN
[FreeTextEntry1] : # will start patient on citalopram 10mg- will refer him to psychiatry at [facility] with suicidal ideation-  Should he feel overwhelmed-  advised him to go immediately to the ED.\par -will follow up in 2 weeks ( since started Escitalopram)\par will continue Metformin 1 g bid  and Novolin 10 U bid\par Have started him on Atorvastatin \par

## 2019-02-15 NOTE — ASSESSMENT
[FreeTextEntry1] : 53y M  past medical history of poorly controlled diabetes. Takes 10 units of Novolin in the morning and 10 units at night. Is depressed since learning that he may have to use insulin for his disease.

## 2019-02-15 NOTE — HISTORY OF PRESENT ILLNESS
[de-identified] : 53y M  past medical history of poorly controlled diabetes. Takes 10 units of Novolin in the morning and 10 units at night. Reports checking BGMs twice a day. Reports range of 145 in the morning and 140 at night. Denies symptoms of hypoglycemia. Has been compliant with medications and diet. Gets up to urinate 3 times at night, but denies polyuria. Denies nausea/vomiting or changes with appetite. Pt reports feeling anxious and overwhelmed with constant puncturing of his fingers/skin and feels depressed. PHQ-9 18/30.

## 2019-02-15 NOTE — PHYSICAL EXAM
[No Acute Distress] : no acute distress [Normal Voice/Communication] : normal voice/communication [Cachectic] : cachexia was observed [Normal Sclera/Conjunctiva] : normal sclera/conjunctiva [PERRL] : pupils equal round and reactive to light [Normal Outer Ear/Nose] : the outer ears and nose were normal in appearance [Normal Oropharynx] : the oropharynx was normal [No JVD] : no jugular venous distention [No Lymphadenopathy] : no lymphadenopathy [No Respiratory Distress] : no respiratory distress  [Clear to Auscultation] : lungs were clear to auscultation bilaterally [No Accessory Muscle Use] : no accessory muscle use [Normal Rate] : normal rate  [Regular Rhythm] : with a regular rhythm [Normal S1, S2] : normal S1 and S2 [No Abdominal Bruit] : a ~M bruit was not heard ~T in the abdomen [No Edema] : there was no peripheral edema [Soft] : abdomen soft [Non Tender] : non-tender [Non-distended] : non-distended [No Masses] : no abdominal mass palpated [No HSM] : no HSM [Normal Posterior Cervical Nodes] : no posterior cervical lymphadenopathy [Normal Anterior Cervical Nodes] : no anterior cervical lymphadenopathy [No CVA Tenderness] : no CVA  tenderness [No Joint Swelling] : no joint swelling [No Rash] : no rash [Normal Gait] : normal gait

## 2019-02-18 DIAGNOSIS — E11.65 TYPE 2 DIABETES MELLITUS WITH HYPERGLYCEMIA: ICD-10-CM

## 2019-02-19 DIAGNOSIS — F32.9 MAJOR DEPRESSIVE DISORDER, SINGLE EPISODE, UNSPECIFIED: ICD-10-CM

## 2019-02-19 DIAGNOSIS — E11.9 TYPE 2 DIABETES MELLITUS WITHOUT COMPLICATIONS: ICD-10-CM

## 2019-03-01 ENCOUNTER — APPOINTMENT (OUTPATIENT)
Age: 54
End: 2019-03-01

## 2019-03-07 ENCOUNTER — APPOINTMENT (OUTPATIENT)
Dept: PODIATRY | Facility: HOSPITAL | Age: 54
End: 2019-03-07

## 2019-03-26 ENCOUNTER — RX RENEWAL (OUTPATIENT)
Age: 54
End: 2019-03-26

## 2019-04-24 ENCOUNTER — RX RENEWAL (OUTPATIENT)
Age: 54
End: 2019-04-24

## 2019-05-13 ENCOUNTER — RESULT CHARGE (OUTPATIENT)
Age: 54
End: 2019-05-13

## 2019-05-13 ENCOUNTER — APPOINTMENT (OUTPATIENT)
Dept: FAMILY MEDICINE | Facility: HOSPITAL | Age: 54
End: 2019-05-13

## 2019-05-13 ENCOUNTER — OUTPATIENT (OUTPATIENT)
Dept: OUTPATIENT SERVICES | Facility: HOSPITAL | Age: 54
LOS: 1 days | End: 2019-05-13
Payer: SELF-PAY

## 2019-05-13 VITALS
HEART RATE: 61 BPM | SYSTOLIC BLOOD PRESSURE: 127 MMHG | BODY MASS INDEX: 22.31 KG/M2 | RESPIRATION RATE: 16 BRPM | WEIGHT: 130 LBS | TEMPERATURE: 98.2 F | OXYGEN SATURATION: 100 % | DIASTOLIC BLOOD PRESSURE: 77 MMHG

## 2019-05-13 DIAGNOSIS — Z00.00 ENCOUNTER FOR GENERAL ADULT MEDICAL EXAMINATION WITHOUT ABNORMAL FINDINGS: ICD-10-CM

## 2019-05-13 LAB
GLUCOSE BLDC GLUCOMTR-MCNC: 160
HBA1C MFR BLD HPLC: 6.5

## 2019-05-13 PROCEDURE — G0463: CPT

## 2019-05-13 RX ORDER — HUMAN INSULIN 100 [IU]/ML
(70-30) 100 INJECTION, SUSPENSION SUBCUTANEOUS TWICE DAILY
Qty: 1 | Refills: 3 | Status: DISCONTINUED | COMMUNITY
Start: 2019-01-18 | End: 2019-05-13

## 2019-05-13 RX ORDER — ESCITALOPRAM OXALATE 10 MG/1
10 TABLET ORAL
Qty: 30 | Refills: 3 | Status: DISCONTINUED | COMMUNITY
Start: 2019-02-15 | End: 2019-05-13

## 2019-05-13 RX ORDER — LISINOPRIL 2.5 MG/1
2.5 TABLET ORAL DAILY
Qty: 90 | Refills: 2 | Status: DISCONTINUED | COMMUNITY
Start: 2019-01-17 | End: 2019-05-13

## 2019-05-13 RX ORDER — BLOOD SUGAR DIAGNOSTIC
30G X 5/16" STRIP MISCELLANEOUS
Qty: 1 | Refills: 2 | Status: DISCONTINUED | COMMUNITY
Start: 2019-01-18 | End: 2019-05-13

## 2019-05-13 RX ORDER — ATORVASTATIN CALCIUM 40 MG/1
40 TABLET, FILM COATED ORAL
Qty: 90 | Refills: 1 | Status: DISCONTINUED | COMMUNITY
Start: 2019-01-17 | End: 2019-05-13

## 2019-05-13 NOTE — PHYSICAL EXAM
[No Acute Distress] : no acute distress [Well Nourished] : well nourished [No JVD] : no jugular venous distention [No Respiratory Distress] : no respiratory distress  [Clear to Auscultation] : lungs were clear to auscultation bilaterally [No Accessory Muscle Use] : no accessory muscle use [Normal Rate] : normal rate  [Regular Rhythm] : with a regular rhythm [Normal S1, S2] : normal S1 and S2 [No Murmur] : no murmur heard [Pedal Pulses Present] : the pedal pulses are present [No Edema] : there was no peripheral edema [Soft] : abdomen soft [Non Tender] : non-tender [Normal Bowel Sounds] : normal bowel sounds [No Rash] : no rash [Normal Gait] : normal gait [de-identified] : Bilateral lower extremity varicose veins.

## 2019-05-13 NOTE — HISTORY OF PRESENT ILLNESS
[FreeTextEntry8] : 2 weeks, gradual onset pain. Described as a pins and needles pain in his back, and bilateral lower extremities. Mild in intensity. No exacerbating or alleviating factors. Pain is constant. Reports he stopped taking his insulin 2 weeks ago. Last A1C was 14% in January. Has not been checking blood sugar because he reports he is nervous. Reports he is adherent with his metformin. \par \par Reports he has cost issues with his medications. \par

## 2019-05-13 NOTE — ASSESSMENT
[FreeTextEntry1] : #Neuropathic pain likely secondary to DM2\par -Use tylenol as needed for pain\par -Follow-up in 2 weeks to consider using gabapentin or other similar agent\par \par #DM2\par -Stop insulin given POCT A1C 6.5\par -Fignerstick 160\par -Start glipizide XL 2.5 mg QD\par -Check blood sugar if hypoglycemic \par -Follow-up 2 weeks\par -Will hold lisinopril for now\par -Simvastatin instead of atorvastatin due to cost concerns\par \par #Depression\par -Change escitalopram to fluoxetine due to cost concerns

## 2019-05-14 DIAGNOSIS — E11.65 TYPE 2 DIABETES MELLITUS WITH HYPERGLYCEMIA: ICD-10-CM

## 2019-05-29 ENCOUNTER — APPOINTMENT (OUTPATIENT)
Dept: FAMILY MEDICINE | Facility: HOSPITAL | Age: 54
End: 2019-05-29

## 2019-07-08 ENCOUNTER — MEDICATION RENEWAL (OUTPATIENT)
Age: 54
End: 2019-07-08

## 2019-09-16 ENCOUNTER — EMERGENCY (EMERGENCY)
Facility: HOSPITAL | Age: 54
LOS: 1 days | Discharge: HOME | End: 2019-09-16
Attending: EMERGENCY MEDICINE | Admitting: EMERGENCY MEDICINE
Payer: MEDICAID

## 2019-09-16 VITALS
RESPIRATION RATE: 18 BRPM | DIASTOLIC BLOOD PRESSURE: 68 MMHG | HEART RATE: 57 BPM | SYSTOLIC BLOOD PRESSURE: 102 MMHG | TEMPERATURE: 98 F | OXYGEN SATURATION: 97 %

## 2019-09-16 VITALS
OXYGEN SATURATION: 97 % | TEMPERATURE: 98 F | HEIGHT: 64.57 IN | HEART RATE: 62 BPM | WEIGHT: 139.99 LBS | DIASTOLIC BLOOD PRESSURE: 73 MMHG | RESPIRATION RATE: 18 BRPM | SYSTOLIC BLOOD PRESSURE: 119 MMHG

## 2019-09-16 LAB
ALBUMIN SERPL ELPH-MCNC: 3.7 G/DL — SIGNIFICANT CHANGE UP (ref 3.3–5)
ALP SERPL-CCNC: 74 U/L — SIGNIFICANT CHANGE UP (ref 40–120)
ALT FLD-CCNC: 28 U/L — SIGNIFICANT CHANGE UP (ref 10–45)
ANION GAP SERPL CALC-SCNC: 8 MMOL/L — SIGNIFICANT CHANGE UP (ref 5–17)
APPEARANCE UR: CLEAR — SIGNIFICANT CHANGE UP
APTT BLD: 30 SEC — SIGNIFICANT CHANGE UP (ref 27.5–36.3)
AST SERPL-CCNC: 15 U/L — SIGNIFICANT CHANGE UP (ref 10–40)
BASOPHILS # BLD AUTO: 0.04 K/UL — SIGNIFICANT CHANGE UP (ref 0–0.2)
BASOPHILS NFR BLD AUTO: 0.7 % — SIGNIFICANT CHANGE UP (ref 0–2)
BILIRUB SERPL-MCNC: 0.7 MG/DL — SIGNIFICANT CHANGE UP (ref 0.2–1.2)
BILIRUB UR-MCNC: NEGATIVE — SIGNIFICANT CHANGE UP
BUN SERPL-MCNC: 10 MG/DL — SIGNIFICANT CHANGE UP (ref 7–23)
CALCIUM SERPL-MCNC: 9.1 MG/DL — SIGNIFICANT CHANGE UP (ref 8.4–10.5)
CHLORIDE SERPL-SCNC: 103 MMOL/L — SIGNIFICANT CHANGE UP (ref 96–108)
CO2 SERPL-SCNC: 30 MMOL/L — SIGNIFICANT CHANGE UP (ref 22–31)
COLOR SPEC: YELLOW — SIGNIFICANT CHANGE UP
CREAT SERPL-MCNC: 0.8 MG/DL — SIGNIFICANT CHANGE UP (ref 0.5–1.3)
DIFF PNL FLD: NEGATIVE — SIGNIFICANT CHANGE UP
EOSINOPHIL # BLD AUTO: 0.14 K/UL — SIGNIFICANT CHANGE UP (ref 0–0.5)
EOSINOPHIL NFR BLD AUTO: 2.5 % — SIGNIFICANT CHANGE UP (ref 0–6)
FLU A RESULT: SIGNIFICANT CHANGE UP
FLU A RESULT: SIGNIFICANT CHANGE UP
FLUAV AG NPH QL: SIGNIFICANT CHANGE UP
FLUBV AG NPH QL: SIGNIFICANT CHANGE UP
GLUCOSE SERPL-MCNC: 141 MG/DL — HIGH (ref 70–99)
GLUCOSE UR QL: NEGATIVE — SIGNIFICANT CHANGE UP
HCT VFR BLD CALC: 40.5 % — SIGNIFICANT CHANGE UP (ref 39–50)
HGB BLD-MCNC: 14.7 G/DL — SIGNIFICANT CHANGE UP (ref 13–17)
IMM GRANULOCYTES NFR BLD AUTO: 0.2 % — SIGNIFICANT CHANGE UP (ref 0–1.5)
INR BLD: 1.07 RATIO — SIGNIFICANT CHANGE UP (ref 0.88–1.16)
KETONES UR-MCNC: NEGATIVE — SIGNIFICANT CHANGE UP
LACTATE SERPL-SCNC: 1.8 MMOL/L — SIGNIFICANT CHANGE UP (ref 0.7–2)
LEUKOCYTE ESTERASE UR-ACNC: NEGATIVE — SIGNIFICANT CHANGE UP
LYMPHOCYTES # BLD AUTO: 1.27 K/UL — SIGNIFICANT CHANGE UP (ref 1–3.3)
LYMPHOCYTES # BLD AUTO: 22.7 % — SIGNIFICANT CHANGE UP (ref 13–44)
MCHC RBC-ENTMCNC: 31.7 PG — SIGNIFICANT CHANGE UP (ref 27–34)
MCHC RBC-ENTMCNC: 36.3 GM/DL — HIGH (ref 32–36)
MCV RBC AUTO: 87.5 FL — SIGNIFICANT CHANGE UP (ref 80–100)
MEV IGG SER-ACNC: >300 AU/ML — SIGNIFICANT CHANGE UP
MEV IGG+IGM SER-IMP: POSITIVE — SIGNIFICANT CHANGE UP
MONOCYTES # BLD AUTO: 0.54 K/UL — SIGNIFICANT CHANGE UP (ref 0–0.9)
MONOCYTES NFR BLD AUTO: 9.7 % — SIGNIFICANT CHANGE UP (ref 2–14)
NEUTROPHILS # BLD AUTO: 3.59 K/UL — SIGNIFICANT CHANGE UP (ref 1.8–7.4)
NEUTROPHILS NFR BLD AUTO: 64.2 % — SIGNIFICANT CHANGE UP (ref 43–77)
NITRITE UR-MCNC: NEGATIVE — SIGNIFICANT CHANGE UP
NRBC # BLD: 0 /100 WBCS — SIGNIFICANT CHANGE UP (ref 0–0)
PH UR: 7 — SIGNIFICANT CHANGE UP (ref 5–8)
PLATELET # BLD AUTO: 227 K/UL — SIGNIFICANT CHANGE UP (ref 150–400)
POTASSIUM SERPL-MCNC: 4.2 MMOL/L — SIGNIFICANT CHANGE UP (ref 3.5–5.3)
POTASSIUM SERPL-SCNC: 4.2 MMOL/L — SIGNIFICANT CHANGE UP (ref 3.5–5.3)
PROT SERPL-MCNC: 7.6 G/DL — SIGNIFICANT CHANGE UP (ref 6–8.3)
PROT UR-MCNC: NEGATIVE — SIGNIFICANT CHANGE UP
PROTHROM AB SERPL-ACNC: 12 SEC — SIGNIFICANT CHANGE UP (ref 10–12.9)
RBC # BLD: 4.63 M/UL — SIGNIFICANT CHANGE UP (ref 4.2–5.8)
RBC # FLD: 11.7 % — SIGNIFICANT CHANGE UP (ref 10.3–14.5)
RSV RESULT: SIGNIFICANT CHANGE UP
RSV RNA RESP QL NAA+PROBE: SIGNIFICANT CHANGE UP
SODIUM SERPL-SCNC: 141 MMOL/L — SIGNIFICANT CHANGE UP (ref 135–145)
SP GR SPEC: 1.01 — SIGNIFICANT CHANGE UP (ref 1.01–1.02)
UROBILINOGEN FLD QL: NEGATIVE — SIGNIFICANT CHANGE UP
WBC # BLD: 5.59 K/UL — SIGNIFICANT CHANGE UP (ref 3.8–10.5)
WBC # FLD AUTO: 5.59 K/UL — SIGNIFICANT CHANGE UP (ref 3.8–10.5)

## 2019-09-16 PROCEDURE — 85730 THROMBOPLASTIN TIME PARTIAL: CPT

## 2019-09-16 PROCEDURE — 86765 RUBEOLA ANTIBODY: CPT

## 2019-09-16 PROCEDURE — 93005 ELECTROCARDIOGRAM TRACING: CPT

## 2019-09-16 PROCEDURE — 36415 COLL VENOUS BLD VENIPUNCTURE: CPT

## 2019-09-16 PROCEDURE — 85027 COMPLETE CBC AUTOMATED: CPT

## 2019-09-16 PROCEDURE — 87631 RESP VIRUS 3-5 TARGETS: CPT

## 2019-09-16 PROCEDURE — 83605 ASSAY OF LACTIC ACID: CPT

## 2019-09-16 PROCEDURE — 96374 THER/PROPH/DIAG INJ IV PUSH: CPT

## 2019-09-16 PROCEDURE — 99284 EMERGENCY DEPT VISIT MOD MDM: CPT

## 2019-09-16 PROCEDURE — 71045 X-RAY EXAM CHEST 1 VIEW: CPT

## 2019-09-16 PROCEDURE — 80053 COMPREHEN METABOLIC PANEL: CPT

## 2019-09-16 PROCEDURE — 87086 URINE CULTURE/COLONY COUNT: CPT

## 2019-09-16 PROCEDURE — 93010 ELECTROCARDIOGRAM REPORT: CPT

## 2019-09-16 PROCEDURE — 71045 X-RAY EXAM CHEST 1 VIEW: CPT | Mod: 26

## 2019-09-16 PROCEDURE — 96361 HYDRATE IV INFUSION ADD-ON: CPT

## 2019-09-16 PROCEDURE — 99284 EMERGENCY DEPT VISIT MOD MDM: CPT | Mod: 25

## 2019-09-16 PROCEDURE — 87040 BLOOD CULTURE FOR BACTERIA: CPT

## 2019-09-16 PROCEDURE — 85610 PROTHROMBIN TIME: CPT

## 2019-09-16 RX ORDER — ACETAMINOPHEN 500 MG
650 TABLET ORAL ONCE
Refills: 0 | Status: COMPLETED | OUTPATIENT
Start: 2019-09-16 | End: 2019-09-16

## 2019-09-16 RX ORDER — SODIUM CHLORIDE 9 MG/ML
1950 INJECTION INTRAMUSCULAR; INTRAVENOUS; SUBCUTANEOUS ONCE
Refills: 0 | Status: COMPLETED | OUTPATIENT
Start: 2019-09-16 | End: 2019-09-16

## 2019-09-16 RX ORDER — KETOROLAC TROMETHAMINE 30 MG/ML
30 SYRINGE (ML) INJECTION ONCE
Refills: 0 | Status: DISCONTINUED | OUTPATIENT
Start: 2019-09-16 | End: 2019-09-16

## 2019-09-16 RX ADMIN — Medication 650 MILLIGRAM(S): at 10:45

## 2019-09-16 RX ADMIN — Medication 30 MILLIGRAM(S): at 10:45

## 2019-09-16 RX ADMIN — Medication 30 MILLIGRAM(S): at 10:21

## 2019-09-16 RX ADMIN — SODIUM CHLORIDE 1950 MILLILITER(S): 9 INJECTION INTRAMUSCULAR; INTRAVENOUS; SUBCUTANEOUS at 07:45

## 2019-09-16 RX ADMIN — SODIUM CHLORIDE 1950 MILLILITER(S): 9 INJECTION INTRAMUSCULAR; INTRAVENOUS; SUBCUTANEOUS at 08:45

## 2019-09-16 RX ADMIN — Medication 650 MILLIGRAM(S): at 10:20

## 2019-09-16 NOTE — ED ADULT NURSE NOTE - PMH
Diabetes    High cholesterol    Hypertension Depression    Diabetes    H/O: hypothyroidism    High cholesterol    Hypertension    Low back pain    Neuropathic pain

## 2019-09-16 NOTE — ED PROVIDER NOTE - OBJECTIVE STATEMENT
I discussed with patient using  [id number].     Patient is 54M h/o DM on Glipizide and HLD on Simvastatin presenting with reports of fever and rash x 1 month. Fever is subjective, never actually checked. Rash is x 1 month and on the face only. Patient notes that he has been also feeling "unwell" x 1 month. He describes generalized malaise with body aches and feeling fatigue. He comes in this morning because last night was the worst. It was "a little" all month but last night he felt like he had the shakes and his body felt numb. No vomiting or diarrhea. No abd pain or chest pain. No headache or neck pain. No cough or SOB. No congestion or eye discharge. No dysuria or back pain. No rash to the remainder of his body. He lives at home with 3 others. He has had no sick contacts or recent travel. Has lived in [place] for 14 years. Works 2 days per week at a [employer].   His friend, at bedside, tells me that she feels there is disease in his blood because no matter how much he showers/bathes, his body smells like it is dying. I discussed with patient using  [id number].     Patient is 54M h/o DM on Glipizide and HLD on Simvastatin presenting with reports of fever and rash x 1 month. Fever is subjective, never actually checked. Rash is x 1 month and on the face only. Patient notes that he has been also feeling "unwell" x 1 month. He describes generalized malaise with body aches and feeling fatigue. He comes in this morning because last night was the worst. It was "a little" all month but last night he felt like he had the shakes and his body felt numb. No vomiting or diarrhea. No abd pain or chest pain. No headache or neck pain. No cough or SOB. No congestion or eye discharge. No dysuria or back pain. No rash to the remainder of his body. He lives at home with 3 others. He has had no sick contacts or recent travel. No insect bites. Has lived in [place] for 14 years. Works 2 days per week at a [employer].   His friend, at bedside, tells me that she feels there is disease in his blood because no matter how much he showers/bathes, his body smells like it is dying.

## 2019-09-16 NOTE — ED PROVIDER NOTE - CLINICAL SUMMARY MEDICAL DECISION MAKING FREE TEXT BOX
I discussed with patient using  [id number].     Patient is 54M h/o DM on Glipizide and HLD on Simvastatin presenting with reports of fever and rash x 1 month. Fever is subjective, never actually checked. Rash is x 1 month and on the face only. Patient notes that he has been also feeling "unwell" x 1 month. He describes generalized malaise with body aches and feeling fatigue. He comes in this morning because last night was the worst. It was "a little" all month but last night he felt like he had the shakes and his body felt numb. No vomiting or diarrhea. No abd pain or chest pain. No headache or neck pain. No cough or SOB. No congestion or eye discharge. No dysuria or back pain. No rash to the remainder of his body. He lives at home with 3 others. He has had no sick contacts or recent travel. Has lived in [place] for 14 years. Works 2 days per week at a [employer].   His friend, at bedside, tells me that she feels there is disease in his blood because no matter how much he showers/bathes, his body smells like it is dying.    Exam with ENMT: No koplik spots. No conjunctivitis. Non toxic appearing.   Skin: No rash to palms or soles. No rash to trunk or extremities. On the face, there is an erythematous papular rash to forehead and cheeks.   MSK: No nuchal rigidity. I discussed with patient using  [id number].     Patient is 54M h/o DM on Glipizide and HLD on Simvastatin presenting with reports of fever and rash x 1 month. Fever is subjective, never actually checked. Rash is x 1 month and on the face only. Patient notes that he has been also feeling "unwell" x 1 month. He describes generalized malaise with body aches and feeling fatigue. He comes in this morning because last night was the worst. It was "a little" all month but last night he felt like he had the shakes and his body felt numb. No vomiting or diarrhea. No abd pain or chest pain. No headache or neck pain. No cough or SOB. No congestion or eye discharge. No dysuria or back pain. No rash to the remainder of his body. He lives at home with 3 others. He has had no sick contacts or recent travel. Has lived in [place] for 14 years. Works 2 days per week at a [employer].   His friend, at bedside, tells me that she feels there is disease in his blood because no matter how much he showers/bathes, his body smells like it is dying.    Exam with ENMT: No koplik spots. No conjunctivitis. Non toxic appearing.   Skin: No rash to palms or soles. No rash to trunk or extremities. On the face, there is an erythematous papular rash to forehead and cheeks.   MSK: No nuchal rigidity.    See progress notes.

## 2019-09-16 NOTE — ED ADULT NURSE NOTE - OBJECTIVE STATEMENT
Pt presents to the ED with complaints of fever and rash, pt maximilian brought pt to the ED as she states he has been sick for 2 weeks. Pt has a rash to the face, pt states that the rash is painful. Pt states he has had a fever but is afebrile upon evaluation, no medications taken prior to arrival. Pt states that he also has generalized pain and aches "all over the body". Pt states that earlier he was short of breath but denies SOB upon assessment, denies chest pain. Pt in no apparent distress upon assessment. Pt does not know his medications or medical problems and states that he is seen by family practice.

## 2019-09-16 NOTE — ED PROVIDER NOTE - PROGRESS NOTE DETAILS
Discussed with Infectioun Control Maris Leiva, who contacted [facility] who says this is not likely measles case. We can treat otherwise per normal, not considering measles due to the compilation of symptoms. She also d/w Dr Lindsey who says he has no record of immunization per clinic records (he is a clinic patient - his last visit was May 2019). Pt feeling improved. Advised for hydration, tylenol/advil as needed for aches or fever. Labs have been sent that wont return for another 2 days potentially. The patient was made aware and understands that he would be contacted if there are any further concerns.

## 2019-09-16 NOTE — ED PROVIDER NOTE - CHIEF COMPLAINT
The patient is a 54y Male complaining of rash.
Principal Discharge DX:	Injury of head, initial encounter  Secondary Diagnosis:	Scalp hematoma, initial encounter  Secondary Diagnosis:	Contusion of ribs, left, sequela

## 2019-09-16 NOTE — ED PROVIDER NOTE - NSFOLLOWUPINSTRUCTIONS_ED_ALL_ED_FT
Worsening, continued or ANY new concerning symptoms return to the emergency department.    Carolynn Ibuprofen 600mg every 6 hours as needed for fever or pain/aches /fiebre o dolor si necesita.     Carolynn Tylenol 650mg every 6 hours as needed for fever or pain/aches /fiebre o dolor si necesita

## 2019-09-16 NOTE — ED ADULT NURSE REASSESSMENT NOTE - NS ED NURSE REASSESS COMMENT FT1
Pt presented from previous shift RN, on airborne isolation in RM 8 due to reported fever and rash by pt.  This RN participated in verbal and physical assessment of pt with MD Booker, as MD Brown called  services for pt due to pt speaking Norwegian language only.  Pt afebrile, healthy appearing, pt states fever at home, not measured by thermometer but "felt hot" on and off the past month.  Generalized body aches on and off for a month.   Rash only noted to face, forehead and cheek area across nasal sinus, states this rash has been there for a month, constant and identifies the rash an allergy.   Denies recent travel.   In ED today because with generalized body aches has been having trouble sleeping.   No other complaints today, denies dizziness, SOB, CP, n/v/d/c.
Pt verbalizes feeling better. Pain and generalized body aches have resolved.

## 2019-09-16 NOTE — ED PROVIDER NOTE - PMH
Depression    Diabetes    H/O: hypothyroidism    High cholesterol    Hypertension    Low back pain    Neuropathic pain

## 2019-09-16 NOTE — ED PROVIDER NOTE - PHYSICAL EXAMINATION
ENMT: No koplik spots.   Skin: No rash to palms or soles. No rash to trunk or extremities. On the face, there is an erythematous papular rash to forehead and cheeks.   MSK: No nuchal rigidity.

## 2019-09-16 NOTE — ED PROVIDER NOTE - NSFOLLOWUPCLINICS_GEN_ALL_ED_FT
Family Practice Clinic  Family Medicine  [address] [place] [address]  Phone: [phone]  Fax:   Follow Up Time:

## 2019-09-16 NOTE — ED ADULT NURSE NOTE - NS ED PATIENT SAFETY CONCERN
OFFICE VISIT    HISTORY    Mitchell Manjarrez is a 50 year old male who presents for:    Physical exam    # Diabetes type 2 with neuropathy  Sees endo: bydureon, atorvastatin, lantus, humalog  Sees pain management: duloxetine, lyrica     # Hypertension   MEDS: lisinopril, metoprolol    # History of stroke - left sided deficits  Uses a cane  Sees neurology: plavix     # Constipation  MEDS: Miralax    Diet: Vegetables, fruits, meats  Exercise: 20 minutes, 2 times a week     Current Outpatient Medications   Medication Sig Dispense Refill   • lisinopril (PRINIVIL,ZESTRIL) 40 MG tablet Take 1 tablet by mouth daily. Dismissed from practice. No further refills from this practice. 30 tablet 0   • atorvastatin (LIPITOR) 80 MG tablet Take 1 tablet by mouth daily. PLEASE CALL OFFICE TO SCHEDULE APPOINTMENT. 30 tablet 0   • clopidogrel (PLAVIX) 75 MG tablet Take 1 tablet by mouth daily. 90 tablet 1   • insulin glargine (LANTUS SOLOSTAR) 100 UNIT/ML pen-injector Inject 100 Units into the skin nightly. 90 mL 3   • traMADol (ULTRAM) 50 MG tablet Take 1 tablet by mouth every 4 hours as needed for Pain. 10 tablet 0   • DULoxetine (CYMBALTA) 30 MG capsule 3 pills daily 90 capsule 11   • pregabalin (LYRICA) 225 MG capsule Take 1 capsule by mouth 2 times daily. 60 capsule 5   • polyethylene glycol (MIRALAX) powder Take 17 g by mouth 2 times daily. 1020 g 5   • metoPROLOL tartrate (LOPRESSOR) 100 MG tablet Take 1 tablet by mouth 2 times daily. 180 tablet 1   • insulin lispro (HUMALOG KWIKPEN) 100 UNIT/ML pen-injector Inject 68 Units into the skin 3 times daily. 105 mL 3   • Insulin Pen Needle (BD ULTRA-FINE PEN NEEDLES) 29G X 12.7MM Misc Use to inject insulin 4 times daily. Remove needle cover(s) to expose needle before injecting. 100 each 11   • ONETOUCH DELICA LANCETS 33G Misc Use to test blood sugar 3 times daily 100 each 11   • ONETOUCH VERIO test strip Test blood sugar 3 times daily as directed. Diagnosis: E11.65, Z79.4. Meter: One  Touch Verio Flex 100 strip 11   • vitamin - therapeutic multivitamins w/minerals (CENTRUM SILVER,THERA-M) Tab Take 1 tablet by mouth daily.     • Omega-3 Fatty Acids (FISH OIL PO) Take 1 tablet by mouth daily.     • Cyanocobalamin (B-12 PO) Take 1 tablet by mouth daily.     • metoPROLOL tartrate (LOPRESSOR) 100 MG tablet Take 1.5 tablet by mouth twice daily. 180 tablet 1     No current facility-administered medications for this visit.      ALLERGIES:   Allergen Reactions   • Aspirin NAUSEA   • Tylenol NAUSEA     Past Medical History:   Diagnosis Date   • Aneurysm (CMS/[facility])     paraopthalmic aneurysm   • Benign essential hypertension 6/1/2007   • Cerebral infarction (CMS/[facility]) 01/12/2017    left sided leg weakness   • Edema     lower legs   • Erectile dysfunction 10/25/2017   • Hemiparesis, left (CMS/[facility]) 1/20/2017   • Hyperlipidemia    • Hyponatremia 12/4/2015   • Non morbid obesity due to excess calories 10/20/2015   • Polyneuropathy     due to diabetes   • Pure hypercholesterolemia 11/3/2015   • Stroke (CMS/[facility])    • Substance abuse (CMS/HCC)    • TMJ disease 2/16/2016   • Tobacco abuse 10/20/2015   • Type 2 diabetes mellitus without complication (CMS/[facility]) 2003    started insulin within 6 months as he was having problems with oral medications     Past Surgical History:   Procedure Laterality Date   • Hb circumcision  05/15/2018   • Ir bx lymph node superficial Left 10/17/2018    PAROTID   • Penile prosthesis implant  05/15/2018   • Removal gallbladder  2006    Cholecystectomy (gallbladder)     Social History     Socioeconomic History   • Marital status: Single     Spouse name: Gabi   • Number of children: Not on file   • Years of education: Not on file   • Highest education level: Not on file   Occupational History   • Occupation: disabled   Social Needs   • Financial resource strain: Not on file   • Food insecurity:     Worry: Not on file     Inability: Not on file   • Transportation needs:     Medical: Not on  file     Non-medical: Not on file   Tobacco Use   • Smoking status: Current Every Day Smoker     Packs/day: 0.50     Years: 20.00     Pack years: 10.00     Types: Cigarettes   • Smokeless tobacco: Never Used   • Tobacco comment: plans on stopping \" i have been trying\"   Substance and Sexual Activity   • Alcohol use: No     Alcohol/week: 0.0 oz   • Drug use: No   • Sexual activity: Yes     Partners: Female   Lifestyle   • Physical activity:     Days per week: Not on file     Minutes per session: Not on file   • Stress: Not on file   Relationships   • Social connections:     Talks on phone: Not on file     Gets together: Not on file     Attends Yazidism service: Not on file     Active member of club or organization: Not on file     Attends meetings of clubs or organizations: Not on file     Relationship status: Not on file   • Intimate partner violence:     Fear of current or ex partner: Not on file     Emotionally abused: Not on file     Physically abused: Not on file     Forced sexual activity: Not on file   Other Topics Concern   • Not on file   Social History Narrative    Uses cane for support cannot exercise or walk for long period. He is  lives alone.     Family History   Problem Relation Age of Onset   • Hypertension Mother    • * Mother          , possibly cancer, lung   • Cancer Father         brain   • Stroke Father    • Cancer Paternal Grandfather         pt has multiple paternal family members that are  from cancer   • Diabetes Sister    • Diabetes Brother    • Stroke Brother    • Glaucoma Brother    • Diabetes Maternal Aunt    • Diabetes Paternal Aunt    • Genitourinary Neg Hx         neg gu cancers     Family Status   Relation Name Status   • Mo     • Fa   at age 65        head cancer   • PGFa  (Not Specified)   • Sis  (Not Specified)   • Bro  (Not Specified)   • MAunt  (Not Specified)   • PAunt  (Not Specified)   • NEG HX  (Not Specified)       PHYSICAL EXAM     Vital Signs:    Visit Vitals  BP (!) 148/96   Pulse 64   Temp 97.3 °F (36.3 °C) (Temporal)   Ht 6' 2\" (1.88 m)   Wt 121.3 kg   SpO2 99%   BMI 34.33 kg/m²     Constitutional:  No acute distress. Well-developed.  Skin:  Warm. Dry.   Head: Normocephalic, atraumatic    Eyes:  Normal conjunctivae. Pupils equal, round, reactive to light and accommodation. Extraocular muscles intact.   Ears, nose, mouth and throat:  Oral mucosa moist. No pharyngeal erythema or tonsillar exudate. Normal external ears and auditory canals. Tympanic membranes gray, translucent bilaterally. Normal external nose. Normal nasal turbinates.   Neck:  Supple. Nontender to palpation.  Lymphatic:  No cervical or submandibular lymphadenopathy.  Respiratory:  Lungs are clear to auscultation bilaterally. Respirations are nonlabored. Breath sounds are equal. No wheezing, rales, or rhonchi.   Cardiovascular:  Regular rate and rhythm. No murmurs. No edema. 2+ dorsalis pedis pulses.  Gastrointestinal:  Soft. Nontender. Nondistended. Normal bowel sounds.  Musculoskeletal:  Uses cane. Calves nontender to palpation. Moves extremities spontaneously.  Neurologic:  No focal neurological deficits observed. Normal speech observed. Cranial nerves II-XII grossly intact. 2+ patellar reflexes.  Psychiatric:  Alert and awake. Normal mood and affect. Responds appropriately to questions and commands.    Diabetic Foot Exam Documentation     Abnormal Bilateral Foot Exam:  Right: Skin integrity is normal - no erythema, blisters, callosities, or ulcers . Dorsalis pedis pulse is present. Pressure sensation using the [place]-Mehul is absent.    Left: Skin integrity is normal - no erythema, blisters, callosities, or ulcers . Dorsalis pedis pulse is present. Pressure sensation using the [place]-Mehul is absent.         Results for orders placed or performed in visit on 07/11/19   GLYCOHEMOGLOBIN   Result Value    Hemoglobin A1C     Est Avg Glucose     Hemoglobin A1C POC 8.7  (A)     Comment: obtained sample from L middle finger, tolerated well      ASSESSMENT & PLAN    Mitchell Manjarrez is a 50 year old male who presents for:  1. Annual physical exam  - CBC & AUTO DIFFERENTIAL; Future  - COMPREHENSIVE METABOLIC PANEL; Future  - THYROID STIMULATING HORMONE REFLEX; Future  - LIPID PANEL WITH REFLEX; Future    2. Uncontrolled type 2 diabetes mellitus with diabetic polyneuropathy, with long-term current use of insulin (CMS/HCC) - not at goal  - GLYCOHEMOGLOBIN  - Sees endo: bydureon, atorvastatin, humalog    - Increase to insulin glargine (LANTUS SOLOSTAR) 100 UNIT/ML pen-injector; Inject 100 Units into the skin nightly.  Dispense: 90 mL; Refill: 3  - Sees pain management: duloxetine, lyrica     3. Essential hypertension  - Refill metoPROLOL tartrate (LOPRESSOR) 100 MG tablet; Take 1.5 tablet by mouth twice daily.  Dispense: 180 tablet; Refill: 1  - Refill lisinopril (PRINIVIL,ZESTRIL) 40 MG tablet; Take 1 tablet by mouth daily. Dismissed from practice. No further refills from this practice.  Dispense: 30 tablet; Refill: 0    4. Cerebrovascular accident (CVA) due to embolism of right carotid artery (CMS/HCC) - left sided deficits  5. Bilateral carotid artery stenosis  - Uses a cane  - Sees neurology: clopidogrel (PLAVIX) 75 MG tablet; Take 1 tablet by mouth daily.  Dispense: 90 tablet; Refill: 1    6. Mixed hyperlipidemia  - atorvastatin (LIPITOR) 80 MG tablet; Take 1 tablet by mouth daily. PLEASE CALL OFFICE TO SCHEDULE APPOINTMENT.  Dispense: 30 tablet; Refill: 0    7. Chronic constipation  - Miralax    Health Maintenance Summary     Colorectal Cancer Screening-Colonoscopy (Every 10 Years)  Overdue since 8/18/2017    Shingles Vaccine (1 of 2)  Overdue since 8/18/2017    Diabetes Foot Exam (Yearly)  Overdue since 8/4/2018    Diabetes A1C (Every 3 Months)  Order placed this encounter    Influenza Vaccine (1)  Next due on 9/1/2019    Diabetes Eye Exam (Yearly)  Next due on 1/8/2020    DM/CKD  GFR (Yearly)  Order placed this encounter    Depression Screening (Yearly)  Next due on 7/11/2020    DTaP/Tdap/Td Vaccine (3 - Td)  Next due on 1/8/2028    Pneumococcal Vaccine 0-64   Completed          Follow-up: Return in about 1 month (around 8/11/2019) for Follow up, 1 year CPX. Sooner if worsening or not improving. Red flags discussed.    Instructed patient on correct medication dosing, usage and adverse effects (if applicable).  All questions and concerns discussed. Patient agreeable to plan.    Sae Yuen MD, MA  Family Medicine with Obstetrics  [address] [place] [address]  Telephone: [phone]           Fax: [phone]   No

## 2019-09-16 NOTE — ED PROVIDER NOTE - PATIENT PORTAL LINK FT
You can access the FollowMyHealth Patient Portal offered by [facility] by registering at the following website: [email or web address]. By joining [employer]’s FollowMyHealth portal, you will also be able to view your health information using other applications (apps) compatible with our system.

## 2019-09-16 NOTE — ED ADULT TRIAGE NOTE - CHIEF COMPLAINT QUOTE
Pt presents to the ED with complaints of fever and rash, pt isolated at entrance and mask placed on pt upon arrival. Pt states that he has body-aches all over since last night and has a rash to the face and back. Pt denies any recent travel, denies sore throat, denies shortness of breath, denies chest pain. No medications PTA.

## 2019-09-17 LAB
CULTURE RESULTS: NO GROWTH — SIGNIFICANT CHANGE UP
SPECIMEN SOURCE: SIGNIFICANT CHANGE UP

## 2019-09-18 LAB — MEV IGM SER-ACNC: NEGATIVE — SIGNIFICANT CHANGE UP

## 2019-09-21 DIAGNOSIS — R21 RASH AND OTHER NONSPECIFIC SKIN ERUPTION: ICD-10-CM

## 2019-09-21 LAB
CULTURE RESULTS: SIGNIFICANT CHANGE UP
CULTURE RESULTS: SIGNIFICANT CHANGE UP
SPECIMEN SOURCE: SIGNIFICANT CHANGE UP
SPECIMEN SOURCE: SIGNIFICANT CHANGE UP

## 2019-09-27 ENCOUNTER — EMERGENCY (EMERGENCY)
Facility: HOSPITAL | Age: 54
LOS: 1 days | Discharge: HOME | End: 2019-09-27
Attending: EMERGENCY MEDICINE | Admitting: EMERGENCY MEDICINE
Payer: MEDICAID

## 2019-09-27 VITALS
TEMPERATURE: 98 F | DIASTOLIC BLOOD PRESSURE: 72 MMHG | HEIGHT: 64.57 IN | WEIGHT: 130.07 LBS | SYSTOLIC BLOOD PRESSURE: 109 MMHG | HEART RATE: 56 BPM | RESPIRATION RATE: 17 BRPM | OXYGEN SATURATION: 97 %

## 2019-09-27 PROCEDURE — 99283 EMERGENCY DEPT VISIT LOW MDM: CPT

## 2019-09-27 PROCEDURE — 82962 GLUCOSE BLOOD TEST: CPT

## 2019-09-27 RX ORDER — IBUPROFEN 200 MG
1 TABLET ORAL
Qty: 16 | Refills: 0
Start: 2019-09-27 | End: 2019-09-30

## 2019-09-27 RX ORDER — GABAPENTIN 400 MG/1
1 CAPSULE ORAL
Qty: 30 | Refills: 0
Start: 2019-09-27 | End: 2019-10-11

## 2019-09-27 RX ORDER — GABAPENTIN 400 MG/1
100 CAPSULE ORAL ONCE
Refills: 0 | Status: COMPLETED | OUTPATIENT
Start: 2019-09-27 | End: 2019-09-27

## 2019-09-27 RX ADMIN — GABAPENTIN 100 MILLIGRAM(S): 400 CAPSULE ORAL at 13:42

## 2019-09-27 NOTE — ED PROVIDER NOTE - PATIENT PORTAL LINK FT
You can access the FollowMyHealth Patient Portal offered by [facility] by registering at the following website: [email or web address]. By joining [employer]’s FollowMyHealth portal, you will also be able to view your health information using other applications (apps) compatible with our system.

## 2019-09-27 NOTE — ED PROVIDER NOTE - NSFOLLOWUPCLINICS_GEN_ALL_ED_FT
Family Practice Clinic  Family Medicine  [address] [place] [id number]  Phone: [phone]  Fax:   Follow Up Time:

## 2019-09-27 NOTE — ED PROVIDER NOTE - NSFOLLOWUPINSTRUCTIONS_ED_ALL_ED_FT
Follow up in Family Practice clinic within 1-2 days.  Follow up with a Vascular Physician within the week- referral above or you can call: Find a Physician helpline ([phone]) for assistance   Start Gabapentin 100g 1 tab 2x/day- this mediation will be increased as needed by your doctor.  Take Motrin 600mg every 6-8hrs as needed for pain with food.   Take all of your other medications as previously prescribed.  Worsening, continued or ANY new concerning symptoms return to the Emergency Department. Follow up in Family Practice clinic within 1-2 days.  Follow up with a Vascular Physician within the week- referral above or you can call: Find a Physician helpline ([phone]) for assistance   Start Gabapentin 100g 1 tab 2x/day- this mediation will be increased as needed by your doctor.  Take Motrin 600mg every 6-8hrs as needed for pain with food.   Take all of your other medications as previously prescribed.  Worsening, continued or ANY new concerning symptoms return to the Emergency Department.                                                         Neuropatía periférica  Peripheral Neuropathy  Introducción  La neuropatía periférica es un tipo de lesión nerviosa. Afecta los nervios que transportan señales entre la médula butterfield y los brazos, las piernas y el eris del cuerpo (nervios periféricos). No afecta los nervios de la médula butterfield ni el cerebro. En los casos de neuropatía periférica, es posible que se dañe un nervio o un marcelo de nervios. La neuropatía periférica es marshal categoría amplia que incluye muchos trastornos nerviosos específicos, justin la neuropatía diabética, la neuropatía hereditaria y el síndrome del túnel carpiano.    ¿Cuáles son las causas?  Esta afección puede ser causada por lo siguiente:  Diabetes. Esta es la causa más común de la neuropatía periférica.  Lesiones en los nervios.  Presión o tensión duraderas en un nervio.  Falta (deficiencia) de vitaminas B. Puede ser a causa del alcoholismo, marshal catie alimentación o marshal restricción en la dieta.  Infecciones.  Enfermedades autoinmunitarias, justin artritis reumatoide y lupus eritematoso sistémico.  Enfermedades de los nervios que se transmiten de padres a hijos (hereditarias).  Algunos medicamentos, por ejemplo, medicamentos contra el cáncer (quimioterapia).  Sustancias venenosas (tóxicas), justin el plomo y el narda.  Flujo deficiente de millicent hacia las piernas.  Enfermedad renal.  Enfermedad tiroidea.  En algunos casos, se desconoce la causa de maria teresa trastorno.    ¿Cuáles son los signos o los síntomas?  Los síntomas de esta afección dependerán de cuáles nervios se hayan dañado. Entre los síntomas más frecuentes, se incluyen los siguientes:  Pérdida de la sensibilidad (adormecimiento) en los pies, las keli o ambos.  Hormigueo en los pies, las keli o ambos.  Dolor urente.  Piel muy sensible.  Debilidad.  Imposibilidad para  partes del cuerpo (parálisis).  Fasciculaciones (temblores musculares).  Torpeza o coordinación deficiente.  Pérdida del equilibrio.  Dificultad para controlar la vejiga.  Sensación de mareo.  Problemas sexuales.  ¿Cómo se diagnostica?  Puede resultar difícil diagnosticar la neuropatía periférica y hallar bernal causa. El médico revisará daniel antecedentes médicos y le hará un examen físico. También se realizará un examen neurológico. Tilleda involucra controlar aquello que se james afectado por el cerebro, la médula butterfield y los nervios (sistema nervioso). Por ejemplo, el médico controlará daniel reflejos, cómo se mueve y bernal belkys de sensibilidad.    También pueden hacerle otros estudios, por ejemplo:  Análisis de millicent.  Electromiografía (EMG) y estudios de conducción nerviosa. Estos estudios controlan la actividad nerviosa y la eficacia con la que los nervios controlan los músculos.  Estudios de diagnóstico por imágenes, justin marshal exploración por tomografía computarizada (TC) o marshal resonancia magnética (RM), para descartar otras causas de los síntomas.  Extracción de marshal pequeña porción de nervio para examinarla en un laboratorio (biopsia del nervio). Tilleda es poco frecuente.  Extracción y evaluación de marshal pequeña cantidad del líquido que rodea el cerebro y la médula butterfield (punción lumbar). Tilleda es poco frecuente.  ¿Cómo se trata?  El tratamiento de esta afección puede incluir lo siguiente:  Tratamiento de la causa preexistente de la neuropatía, justin la diabetes, marshal enfermedad renal o la deficiencia de vitaminas.  Interrupción de los medicamentos que pueden causar la neuropatía, justin la quimioterapia.  Medicamentos para aliviar el dolor. Entre los medicamentos se incluyen los siguientes:  Analgésicos de venta valentín o recetados.  Medicamentos anticonvulsivos.  Antidepresivos.  Parches analgésicos que se colocan en las zonas de la piel que le duelen.  Cirugía para aliviar la presión en un nervio o para eliminar un nervio que esté provocando dolor.  Fisioterapia para ayudar a mejorar el movimiento y el equilibrio.  Dispositivos para ayudarlo a movilizarse (dispositivos de ayuda).  Siga estas indicaciones en bernal casa:  Medicamentos     [place] los medicamentos de venta valentín y los recetados solamente justin se lo haya indicado el médico. No tome otros medicamentos sin consultar con bernal médico nael.  No conduzca ni use maquinaria pesada mientras madai analgésicos recetados.  Estilo de paras     Image   No consuma ningún producto que contenga nicotina o tabaco, justin cigarrillos y cigarrillos electrónicos. Fumar abelardo que la millicent llegue a los nervios dañados. Si necesita ayuda para dejar de fumar, consulte al médico.  Evite o limite el consumo alcohol. El exceso de alcohol puede causar marshal deficiencia de vitamina B, y esta es necesaria para conservar el buen estado de los nervios.  Consuma marshal dieta saludable. Tilleda incluye lo siguiente:  Consuma alimentos ricos en fibra, justin frutas y verduras frescas, cereales integrales y frijoles.  Limite el consumo de alimentos ricos en grasas y azúcares procesados, justin los alimentos fritos o dulces.  Instrucciones generales     Image   Si tiene diabetes, trabaje estrechamente con el médico para mantener bajo control el nivel de azúcar en la millicent.  Si siente adormecimiento en los pies, edmundo lo siguiente:  Realice controles todos los días para detectar signos de lesión o infección. Observe señales de enrojecimiento, calor e hinchazón.  Use calcetines acolchados y zapatos cómodos. Estos ayudan a proteger los pies.  Desarrolle un buen sistema de apoyo. Vivir con neuropatía periférica puede ser estresante. Considere la posibilidad de hablar con un especialista en aguilar mental o de unirse a un marcelo de ayuda.  Use los dispositivos de ayuda y asista a fisioterapia justin se lo haya indicado el médico. Tilleda podría incluir el uso de un andador o un bastón.  Concurra a todas las visitas de control justin se lo haya indicado el médico. Tilleda es importante.  Comuníquese con un médico si:  Advierte signos o síntomas nuevos de la neuropatía periférica.  Le está costando lidiar con la neuropatía periférica desde el punto de vista emocional.  El dolor no está bajo control.  Solicite ayuda de inmediato si:  Tiene marshal lesión o marshal infección que no se bobo con normalidad.  Comienza a tener debilidad en un brazo o marshal pierna.  Se  con frecuencia.  Resumen  La neuropatía periférica es marshal afección en la que los nervios de los brazos o las piernas se dañan, lo que provoca adormecimiento, debilidad o dolor.  Existen muchas causas de la neuropatía periférica; entre ellas, diabetes, compresión de algún nervio, deficiencia de vitaminas, enfermedad autoinmunitaria y afecciones hereditarias.  Puede resultar difícil diagnosticar la neuropatía periférica y hallar bernal causa. El médico revisará daniel antecedentes médicos y le realizará un examen físico y algunos estudios, justin análisis de millicent y estudios de la actividad nerviosa.  El tratamiento implica tratar la causa preexistente de la neuropatía y usar medicamentos para controlar el dolor. La fisioterapia y los dispositivos de ayuda también podrían ser útiles.  Esta información no tiene justin fin reemplazar el consejo del médico. Asegúrese de hacerle al médico cualquier pregunta que tenga.

## 2019-09-27 NOTE — ED PROVIDER NOTE - CARE PROVIDER_API CALL
Eligio Davis)  Surgery  [address] [facility], [address]  [place], [place] [id number]  Phone: [phone]  Fax: [phone]  Follow Up Time:

## 2019-09-27 NOTE — ED PROVIDER NOTE - LOCATION
Check B12 and CMP today for the numbness/tingling to her hands. Follow-up if symptoms persist or increase, consider neurology referral.    Pap obtained today, results be back in a week or 2 we will call you.     Written instructions for birth control pill leg

## 2019-09-27 NOTE — ED ADULT NURSE NOTE - OBJECTIVE STATEMENT
54 yr old male ambulatory from home with significant other for evaluation of "burning hot pain to lower legs" x 2 weeks. Pt denies weakness/fevers/chills/nausea/vomiting. Multiple varicose veins noted to lower extremities. Pt Bulgarian speaking, translation provided. PMHX: Diabetes Type 2, Varicose veins.

## 2019-09-27 NOTE — ED PROVIDER NOTE - CLINICAL SUMMARY MEDICAL DECISION MAKING FREE TEXT BOX
53 y/o M h/o DM pw "burning hot pain" to B/L LE's worse at night x 2 weeks with h/o peripheral neuropathy and large superficial varicosities without evidence of phlebitis nor DVT on exam- FS in - will start gabapentin and have follow up with family practice and vascular

## 2019-09-27 NOTE — ED ADULT NURSE REASSESSMENT NOTE - NS ED NURSE REASSESS COMMENT FT1
Diabetic teaching done.  mg/dl. Pt Gabonese speaking. translation about diabetic education provided. Pt states he doesn't check his blood sugar that much at home. Pt has a meter. Education provided to pt and significant other. Pt verbalizes instructions and understanding of care.

## 2019-09-27 NOTE — ED PROVIDER NOTE - ATTENDING CONTRIBUTION TO CARE
I personally evaluated the patient. I reviewed the Resident’s or Physician Assistant’s note (as assigned above), and agree with the findings and plan except as documented in my note.     Patient is a diabetic . complaints of pain in both lower ext. Pain is tingling     Pe : no swelling   negative homans    Dx peripheral neuropathy

## 2019-09-27 NOTE — ED PROVIDER NOTE - CHPI ED SYMPTOMS NEG
no stiffness/no fever/no bruising/no back pain/no difficulty bearing weight/no deformity/no numbness/no tingling/no weakness

## 2019-09-27 NOTE — ED PROVIDER NOTE - OBJECTIVE STATEMENT
Translation via fluent Uzbek speaking RN Radha Zamarripa:  53 y/o M h/o DM pw "burning hot pain" to B/L LE's worse at night x 2 weeks. Denies weakness, numbness, LE swelling, chest pain, shortness of breath, fever, chills, nausea, vomiting. Does not check his sugar level at home.  PMD- Family Practice last seen 4 months ago

## 2019-09-27 NOTE — ED PROVIDER NOTE - LOWER EXTREMITY EXAM, RIGHT
(+) large below knee superficial varicosities noted with diffuse TTP- no swelling, no erythema, no ecchymosis, no palpable cords

## 2019-10-06 ENCOUNTER — FORM ENCOUNTER (OUTPATIENT)
Age: 54
End: 2019-10-06

## 2019-10-07 ENCOUNTER — OUTPATIENT (OUTPATIENT)
Dept: OUTPATIENT SERVICES | Facility: HOSPITAL | Age: 54
LOS: 1 days | End: 2019-10-07
Payer: SELF-PAY

## 2019-10-07 ENCOUNTER — APPOINTMENT (OUTPATIENT)
Dept: FAMILY MEDICINE | Facility: HOSPITAL | Age: 54
End: 2019-10-07
Payer: SELF-PAY

## 2019-10-07 ENCOUNTER — RESULT CHARGE (OUTPATIENT)
Age: 54
End: 2019-10-07

## 2019-10-07 ENCOUNTER — MED ADMIN CHARGE (OUTPATIENT)
Age: 54
End: 2019-10-07

## 2019-10-07 VITALS
BODY MASS INDEX: 23.69 KG/M2 | DIASTOLIC BLOOD PRESSURE: 71 MMHG | RESPIRATION RATE: 16 BRPM | OXYGEN SATURATION: 99 % | HEART RATE: 64 BPM | TEMPERATURE: 98.2 F | WEIGHT: 138 LBS | SYSTOLIC BLOOD PRESSURE: 105 MMHG

## 2019-10-07 DIAGNOSIS — H54.7 UNSPECIFIED VISUAL LOSS: ICD-10-CM

## 2019-10-07 DIAGNOSIS — Z00.00 ENCOUNTER FOR GENERAL ADULT MEDICAL EXAMINATION WITHOUT ABNORMAL FINDINGS: ICD-10-CM

## 2019-10-07 DIAGNOSIS — M54.5 LOW BACK PAIN: ICD-10-CM

## 2019-10-07 DIAGNOSIS — R63.4 ABNORMAL WEIGHT LOSS: ICD-10-CM

## 2019-10-07 DIAGNOSIS — I83.813 VARICOSE VEINS OF BILATERAL LOWER EXTREMITIES WITH PAIN: ICD-10-CM

## 2019-10-07 DIAGNOSIS — I80.3 PHLEBITIS AND THROMBOPHLEBITIS OF LOWER EXTREMITIES, UNSPECIFIED: ICD-10-CM

## 2019-10-07 LAB — HBA1C MFR BLD HPLC: 6.7

## 2019-10-07 PROCEDURE — 73630 X-RAY EXAM OF FOOT: CPT | Mod: 26,RT

## 2019-10-07 PROCEDURE — 73630 X-RAY EXAM OF FOOT: CPT

## 2019-10-07 PROCEDURE — G0463: CPT

## 2019-10-07 RX ORDER — ISOPROPYL ALCOHOL 70 ML/100ML
SWAB TOPICAL
Qty: 100 | Refills: 3 | Status: ACTIVE | COMMUNITY
Start: 2019-02-01 | End: 1900-01-01

## 2019-10-07 NOTE — PHYSICAL EXAM
[No Acute Distress] : no acute distress [Well Nourished] : well nourished [Well Developed] : well developed [No Respiratory Distress] : no respiratory distress  [Well-Appearing] : well-appearing [No Accessory Muscle Use] : no accessory muscle use [Normal Rate] : normal rate  [Clear to Auscultation] : lungs were clear to auscultation bilaterally [Regular Rhythm] : with a regular rhythm [Normal S1, S2] : normal S1 and S2 [Normal Affect] : the affect was normal [Normal Gait] : normal gait [No Rash] : no rash [Normal Insight/Judgement] : insight and judgment were intact [Right Foot Was Examined] : Right foot ~C was examined [Left Foot Was Examined] : left foot ~C was examined [] : normal [de-identified] : mild erthymea on dorsal aspect of right foot, mild weight bearing on right foot, mild tenderness on palpation of mid foot. no swelling or calor on palpation. [TWNoteComboBox5] : +2 [TWNoteComboBox6] : +2

## 2019-10-07 NOTE — HISTORY OF PRESENT ILLNESS
[FreeTextEntry8] : 54 year old male here for f/u visit from ED. Pt report went to ED 9/27/19 for "neuropathy pain" however, was not sent home on any medication. Pt also reports yesterday he was moving a piece of wood and it dropped on his R foot. Pt reports mild swelling, and mild discomfort walking. he reports pain is 5/10 on the dorsal aspect of R foot. no radiation or numbness or tingling. Pt reports applying ice on his right foot which helped but still has intermittent pain. Pt has no further complaints.\par \par : [id number]

## 2019-10-07 NOTE — ASSESSMENT
[FreeTextEntry1] : 54 year old male as above\par \par #right foot pain\par - f/u with right foot xray\par - tylenol/ibuprofen for foot pain\par - rest, elevate as directed\par \par #DM2\par - repeat a1c 6.7\par - continue metformin \par - f/u repeat a1c in 3 months\par \par RTC in 2 weeks  for f/u with PCP\par \par \par case discussed with Dr. Inman

## 2019-10-08 DIAGNOSIS — E11.65 TYPE 2 DIABETES MELLITUS WITH HYPERGLYCEMIA: ICD-10-CM

## 2019-10-08 DIAGNOSIS — M79.671 PAIN IN RIGHT FOOT: ICD-10-CM

## 2019-10-08 DIAGNOSIS — M79.2 NEURALGIA AND NEURITIS, UNSPECIFIED: ICD-10-CM

## 2019-10-23 ENCOUNTER — APPOINTMENT (OUTPATIENT)
Dept: FAMILY MEDICINE | Facility: HOSPITAL | Age: 54
End: 2019-10-23

## 2020-09-16 DIAGNOSIS — S61.419A LACERATION W/OUT FOREIGN BODY OF UNSPECIFIED HAND, INITIAL ENCOUNTER: ICD-10-CM

## 2020-09-16 DIAGNOSIS — E66.3 OVERWEIGHT: ICD-10-CM

## 2020-09-16 DIAGNOSIS — Z87.19 PERSONAL HISTORY OF OTHER DISEASES OF THE DIGESTIVE SYSTEM: ICD-10-CM

## 2020-09-16 DIAGNOSIS — Z11.1 ENCOUNTER FOR SCREENING FOR RESPIRATORY TUBERCULOSIS: ICD-10-CM

## 2020-09-16 DIAGNOSIS — M54.9 DORSALGIA, UNSPECIFIED: ICD-10-CM

## 2020-09-16 DIAGNOSIS — Z92.29 PERSONAL HISTORY OF OTHER DRUG THERAPY: ICD-10-CM

## 2020-09-16 DIAGNOSIS — Z87.2 PERSONAL HISTORY OF DISEASES OF THE SKIN AND SUBCUTANEOUS TISSUE: ICD-10-CM

## 2020-09-17 ENCOUNTER — RESULT CHARGE (OUTPATIENT)
Age: 55
End: 2020-09-17

## 2020-09-17 ENCOUNTER — OUTPATIENT (OUTPATIENT)
Dept: OUTPATIENT SERVICES | Facility: HOSPITAL | Age: 55
LOS: 1 days | End: 2020-09-17
Payer: COMMERCIAL

## 2020-09-17 ENCOUNTER — APPOINTMENT (OUTPATIENT)
Dept: FAMILY MEDICINE | Facility: HOSPITAL | Age: 55
End: 2020-09-17

## 2020-09-17 ENCOUNTER — OUTPATIENT (OUTPATIENT)
Dept: OUTPATIENT SERVICES | Facility: HOSPITAL | Age: 55
LOS: 1 days | End: 2020-09-17
Payer: SELF-PAY

## 2020-09-17 VITALS
WEIGHT: 130 LBS | BODY MASS INDEX: 22.2 KG/M2 | OXYGEN SATURATION: 97 % | HEART RATE: 68 BPM | HEIGHT: 64 IN | TEMPERATURE: 97.6 F | RESPIRATION RATE: 16 BRPM | DIASTOLIC BLOOD PRESSURE: 70 MMHG | SYSTOLIC BLOOD PRESSURE: 107 MMHG

## 2020-09-17 DIAGNOSIS — Z86.59 PERSONAL HISTORY OF OTHER MENTAL AND BEHAVIORAL DISORDERS: ICD-10-CM

## 2020-09-17 DIAGNOSIS — Z00.00 ENCOUNTER FOR GENERAL ADULT MEDICAL EXAMINATION WITHOUT ABNORMAL FINDINGS: ICD-10-CM

## 2020-09-17 DIAGNOSIS — M79.671 PAIN IN RIGHT FOOT: ICD-10-CM

## 2020-09-17 DIAGNOSIS — Z86.39 PERSONAL HISTORY OF OTHER ENDOCRINE, NUTRITIONAL AND METABOLIC DISEASE: ICD-10-CM

## 2020-09-17 DIAGNOSIS — M79.2 NEURALGIA AND NEURITIS, UNSPECIFIED: ICD-10-CM

## 2020-09-17 LAB — HBA1C MFR BLD HPLC: 14

## 2020-09-17 PROCEDURE — 82274 ASSAY TEST FOR BLOOD FECAL: CPT

## 2020-09-17 PROCEDURE — G0463: CPT

## 2020-09-18 DIAGNOSIS — E11.65 TYPE 2 DIABETES MELLITUS WITH HYPERGLYCEMIA: ICD-10-CM

## 2020-09-22 PROBLEM — M79.2 NEUROPATHIC PAIN: Status: RESOLVED | Noted: 2019-05-13 | Resolved: 2020-09-22

## 2020-09-22 NOTE — HISTORY OF PRESENT ILLNESS
[FreeTextEntry1] : [id number] [FreeTextEntry2] : Osmel [TWNoteComboBox1] : Micronesian [de-identified] : Patient is a 56 y/o M here for diabetes follow-up. Patient has been unemployed for the past 5 months due to the COVID-19 pandemic. He has not taken any of his diabetes medications (glipizide and metformin) since the past five months. His POCT Hgb A1C was 14.0 in clinic today. He endorses blurry vision in both eyes. He has neuropathic pain which he was diagnosed with during an ER visit in September 2019. He was prescribed gabapentin for the pain, but also has not been able to take it. Patient reports that he in uninsured and cannot afford his medications. Today, we sent over his medications to [facility] in [place] as the prices there were affordable. Patient will meet with Josefina this coming Wednesday 9/23/20 at 10 A.M for diabetes education. Patient does not report polydipsia, polyphagia, or polyuria at this time. He was given a referral to see the opthalmologist and podiatrist.

## 2020-09-22 NOTE — HISTORY OF PRESENT ILLNESS
[FreeTextEntry1] : [id number] [FreeTextEntry2] : Osmel [TWNoteComboBox1] : Hungarian [de-identified] : Patient is a 56 y/o M here for diabetes follow-up. Patient has been unemployed for the past 5 months due to the COVID-19 pandemic. He has not taken any of his diabetes medications (glipizide and metformin) since the past five months. His POCT Hgb A1C was 14.0 in clinic today. He endorses blurry vision in both eyes. He has neuropathic pain which he was diagnosed with during an ER visit in September 2019. He was prescribed gabapentin for the pain, but also has not been able to take it. Patient reports that he in uninsured and cannot afford his medications. Today, we sent over his medications to [facility] in [place] as the prices there were affordable. Patient will meet with Josefina this coming Wednesday 9/23/20 at 10 A.M for diabetes education. Patient does not report polydipsia, polyphagia, or polyuria at this time. He was given a referral to see the opthalmologist and podiatrist.

## 2020-09-22 NOTE — PHYSICAL EXAM
[No Acute Distress] : no acute distress [Well Nourished] : well nourished [Well Developed] : well developed [EOMI] : extraocular movements intact [Normal] : normal rate, regular rhythm, normal S1 and S2 and no murmur heard [Pedal Pulses Present] : the pedal pulses are present [Right Foot Was Examined] : Right foot ~C was examined [Left Foot Was Examined] : left foot ~C was examined [None] : no ulcers in either foot were found [] : both feet [de-identified] : red sclera  [de-identified] : loss of protective sensation in bilateral feet during monofilament diabetic foot exam [TWNoteComboBox5] : False [TWNoteComboBox4] : +2

## 2020-09-22 NOTE — PLAN
[FreeTextEntry1] : #Type 2 DM, uncontrolled \par - POCT Hgb A1c 14.0 today \par - Renewed medications and sent to [facility], gave patient GoodRx card \par - Appointment with Josefina on 9/23/20 at 10 AM for diabetes education \par - Podiatry referral \par - Opthalmology referral \par \par #Neuropathy of bilateral lower extremities \par - Renewed Gabapentin \par \par Plan discussed with Dr. Palafox. \par \par

## 2020-09-22 NOTE — PHYSICAL EXAM
[No Acute Distress] : no acute distress [Well Nourished] : well nourished [Well Developed] : well developed [EOMI] : extraocular movements intact [Normal] : normal rate, regular rhythm, normal S1 and S2 and no murmur heard [Pedal Pulses Present] : the pedal pulses are present [Right Foot Was Examined] : Right foot ~C was examined [Left Foot Was Examined] : left foot ~C was examined [None] : no ulcers in either foot were found [] : both feet [de-identified] : red sclera  [de-identified] : loss of protective sensation in bilateral feet during monofilament diabetic foot exam [TWNoteComboBox5] : False [TWNoteComboBox4] : +2

## 2020-09-23 LAB — HEMOCCULT STL QL IA: NEGATIVE

## 2020-09-28 ENCOUNTER — APPOINTMENT (OUTPATIENT)
Dept: FAMILY MEDICINE | Facility: HOSPITAL | Age: 55
End: 2020-09-28

## 2020-10-06 ENCOUNTER — APPOINTMENT (OUTPATIENT)
Dept: FAMILY MEDICINE | Facility: HOSPITAL | Age: 55
End: 2020-10-06

## 2020-10-06 VITALS
DIASTOLIC BLOOD PRESSURE: 82 MMHG | SYSTOLIC BLOOD PRESSURE: 124 MMHG | HEART RATE: 74 BPM | OXYGEN SATURATION: 98 % | WEIGHT: 134 LBS | TEMPERATURE: 97.7 F | BODY MASS INDEX: 23 KG/M2 | RESPIRATION RATE: 16 BRPM

## 2020-10-21 ENCOUNTER — RESULT CHARGE (OUTPATIENT)
Age: 55
End: 2020-10-21

## 2020-10-21 ENCOUNTER — OUTPATIENT (OUTPATIENT)
Dept: OUTPATIENT SERVICES | Facility: HOSPITAL | Age: 55
LOS: 1 days | End: 2020-10-21
Payer: SELF-PAY

## 2020-10-21 ENCOUNTER — APPOINTMENT (OUTPATIENT)
Dept: FAMILY MEDICINE | Facility: HOSPITAL | Age: 55
End: 2020-10-21

## 2020-10-21 VITALS
SYSTOLIC BLOOD PRESSURE: 105 MMHG | WEIGHT: 126 LBS | DIASTOLIC BLOOD PRESSURE: 66 MMHG | HEART RATE: 63 BPM | RESPIRATION RATE: 14 BRPM | BODY MASS INDEX: 21.63 KG/M2 | OXYGEN SATURATION: 99 % | TEMPERATURE: 97.3 F

## 2020-10-21 DIAGNOSIS — Z00.00 ENCOUNTER FOR GENERAL ADULT MEDICAL EXAMINATION WITHOUT ABNORMAL FINDINGS: ICD-10-CM

## 2020-10-21 LAB — GLUCOSE BLDC GLUCOMTR-MCNC: 352 MG/DL — HIGH (ref 70–99)

## 2020-10-21 PROCEDURE — 80061 LIPID PANEL: CPT

## 2020-10-21 PROCEDURE — 85025 COMPLETE CBC W/AUTO DIFF WBC: CPT

## 2020-10-21 PROCEDURE — 80053 COMPREHEN METABOLIC PANEL: CPT

## 2020-10-21 PROCEDURE — 82962 GLUCOSE BLOOD TEST: CPT

## 2020-10-21 PROCEDURE — 82043 UR ALBUMIN QUANTITATIVE: CPT

## 2020-10-21 PROCEDURE — G0463: CPT

## 2020-10-21 RX ORDER — GLIPIZIDE 2.5 MG/1
2.5 TABLET, FILM COATED, EXTENDED RELEASE ORAL DAILY
Qty: 30 | Refills: 5 | Status: DISCONTINUED | COMMUNITY
Start: 2019-05-13 | End: 2020-10-21

## 2020-10-23 DIAGNOSIS — E11.65 TYPE 2 DIABETES MELLITUS WITH HYPERGLYCEMIA: ICD-10-CM

## 2020-10-23 DIAGNOSIS — E11.40 TYPE 2 DIABETES MELLITUS WITH DIABETIC NEUROPATHY, UNSPECIFIED: ICD-10-CM

## 2020-10-23 NOTE — HISTORY OF PRESENT ILLNESS
[FreeTextEntry1] : f/u DM [de-identified] : 55 year old male with PMHx of DM2 who presents for follow up. Last visit A1c was 14, patient was prescribed oral medications but he did not pick them up. At that time he refused insulin. In addition, he has not checked his blood sugars because of fear of needles. No additional complaints or concerns. Denies polyuria, polydipsia, polyphagia, chest pain, SOB.

## 2020-10-23 NOTE — ASSESSMENT
[FreeTextEntry1] : 55 year old male with PMHx of DM2 who presents for follow up.\par \par DM2\par - uncontrolled\par - A1c 14 in Sept\par - patient refuses to start insulin\par - metformin and glipizide sent to pharmacy\par - hypoglycemia precautions and necessary actions given\par - urine micro\par - advised to obtain fingersticks in the am prior to eating and 2 h after each meal and to bring log next visit\par - statin\par - opthalmology appointment in December\par - follow up with DM educator and SW\par \par DM neuropathy\par - gabapentin\par - podiatry referral given\par \par Depression\par - PHQ9- 11\par - continue SSRI\par - no suicidal/homicidal ideation\par \par Healthcare maintenance\par - labs\par \par F/u in 1-2 weeks\par D/w Dr. Huizar

## 2020-10-23 NOTE — END OF VISIT
[FreeTextEntry3] : Discussed with Dr Soliman; 56 y/o male with uncontrolled DM II latest HBA1C 14.0- was prescribed Metformin and Glipizide last visit- has not started yet: FS and Urine dipstix today, pt needs to be on Insulin/ GLP1 or SGLT2- does not want to start Insulin , must f/u with DN Educator, f/u podiatry and optometry for dilated eye exam, FIT Neg 09/20; agree with plan of care unless specified.\par

## 2020-11-05 ENCOUNTER — NON-APPOINTMENT (OUTPATIENT)
Age: 55
End: 2020-11-05

## 2020-11-06 ENCOUNTER — APPOINTMENT (OUTPATIENT)
Dept: FAMILY MEDICINE | Facility: HOSPITAL | Age: 55
End: 2020-11-06

## 2020-11-06 ENCOUNTER — NON-APPOINTMENT (OUTPATIENT)
Age: 55
End: 2020-11-06

## 2020-11-30 ENCOUNTER — NON-APPOINTMENT (OUTPATIENT)
Age: 55
End: 2020-11-30

## 2020-11-30 LAB
ALBUMIN SERPL ELPH-MCNC: 4.5 G/DL
ALP BLD-CCNC: 84 U/L
ALT SERPL-CCNC: 14 U/L
ANION GAP SERPL CALC-SCNC: 13 MMOL/L
AST SERPL-CCNC: 14 U/L
BASOPHILS # BLD AUTO: 0.04 K/UL
BASOPHILS NFR BLD AUTO: 0.9 %
BILIRUB SERPL-MCNC: 0.7 MG/DL
BILIRUB UR QL STRIP: NEGATIVE
BUN SERPL-MCNC: 11 MG/DL
CALCIUM SERPL-MCNC: 9.4 MG/DL
CHLORIDE SERPL-SCNC: 98 MMOL/L
CHOLEST SERPL-MCNC: 209 MG/DL
CO2 SERPL-SCNC: 26 MMOL/L
CREAT SERPL-MCNC: 0.6 MG/DL
CREAT SPEC-SCNC: 54 MG/DL
EOSINOPHIL # BLD AUTO: 0.19 K/UL
EOSINOPHIL NFR BLD AUTO: 4.4 %
GLUCOSE BLDC GLUCOMTR-MCNC: 352
GLUCOSE SERPL-MCNC: 366 MG/DL
GLUCOSE UR-MCNC: NORMAL
HCG UR QL: 0.2 EU/DL
HCT VFR BLD CALC: 41.9 %
HDLC SERPL-MCNC: 44 MG/DL
HGB BLD-MCNC: 15.1 G/DL
HGB UR QL STRIP.AUTO: NEGATIVE
IMM GRANULOCYTES NFR BLD AUTO: 0 %
KETONES UR-MCNC: NEGATIVE
LDLC SERPL CALC-MCNC: 129 MG/DL
LEUKOCYTE ESTERASE UR QL STRIP: NEGATIVE
LYMPHOCYTES # BLD AUTO: 1.61 K/UL
LYMPHOCYTES NFR BLD AUTO: 37.7 %
MAN DIFF?: NORMAL
MCHC RBC-ENTMCNC: 31.9 PG
MCHC RBC-ENTMCNC: 36 GM/DL
MCV RBC AUTO: 88.4 FL
MICROALBUMIN 24H UR DL<=1MG/L-MCNC: <1.2 MG/DL
MICROALBUMIN/CREAT 24H UR-RTO: NORMAL MG/G
MONOCYTES # BLD AUTO: 0.41 K/UL
MONOCYTES NFR BLD AUTO: 9.6 %
NEUTROPHILS # BLD AUTO: 2.02 K/UL
NEUTROPHILS NFR BLD AUTO: 47.4 %
NITRITE UR QL STRIP: NEGATIVE
NONHDLC SERPL-MCNC: 165 MG/DL
PH UR STRIP: 6.5
PLATELET # BLD AUTO: 233 K/UL
POTASSIUM SERPL-SCNC: 4.3 MMOL/L
PROT SERPL-MCNC: 7 G/DL
PROT UR STRIP-MCNC: NEGATIVE
RBC # BLD: 4.74 M/UL
RBC # FLD: 11.5 %
SODIUM SERPL-SCNC: 137 MMOL/L
SP GR UR STRIP: 1.02
TRIGL SERPL-MCNC: 177 MG/DL
WBC # FLD AUTO: 4.27 K/UL

## 2020-12-29 ENCOUNTER — APPOINTMENT (OUTPATIENT)
Dept: OPHTHALMOLOGY | Facility: CLINIC | Age: 55
End: 2020-12-29

## 2021-01-11 ENCOUNTER — APPOINTMENT (OUTPATIENT)
Dept: OPHTHALMOLOGY | Facility: CLINIC | Age: 56
End: 2021-01-11

## 2021-01-16 ENCOUNTER — RESULT CHARGE (OUTPATIENT)
Age: 56
End: 2021-01-16

## 2021-01-16 ENCOUNTER — OUTPATIENT (OUTPATIENT)
Dept: OUTPATIENT SERVICES | Facility: HOSPITAL | Age: 56
LOS: 1 days | End: 2021-01-16
Payer: SELF-PAY

## 2021-01-16 ENCOUNTER — APPOINTMENT (OUTPATIENT)
Dept: FAMILY MEDICINE | Facility: HOSPITAL | Age: 56
End: 2021-01-16
Payer: SELF-PAY

## 2021-01-16 VITALS
SYSTOLIC BLOOD PRESSURE: 105 MMHG | HEART RATE: 65 BPM | DIASTOLIC BLOOD PRESSURE: 69 MMHG | WEIGHT: 125 LBS | BODY MASS INDEX: 21.46 KG/M2 | RESPIRATION RATE: 14 BRPM | TEMPERATURE: 97.1 F | OXYGEN SATURATION: 97 %

## 2021-01-16 DIAGNOSIS — Z00.00 ENCOUNTER FOR GENERAL ADULT MEDICAL EXAMINATION WITHOUT ABNORMAL FINDINGS: ICD-10-CM

## 2021-01-16 DIAGNOSIS — Z12.11 ENCOUNTER FOR SCREENING FOR MALIGNANT NEOPLASM OF COLON: ICD-10-CM

## 2021-01-16 DIAGNOSIS — Z29.9 ENCOUNTER FOR PROPHYLACTIC MEASURES, UNSPECIFIED: ICD-10-CM

## 2021-01-16 PROCEDURE — 93005 ELECTROCARDIOGRAM TRACING: CPT

## 2021-01-16 PROCEDURE — G0463: CPT

## 2021-01-16 PROCEDURE — 93010 ELECTROCARDIOGRAM REPORT: CPT

## 2021-01-16 NOTE — PHYSICAL EXAM
[No Acute Distress] : no acute distress [Well-Appearing] : well-appearing [Clear to Auscultation] : lungs were clear to auscultation bilaterally [No Respiratory Distress] : no respiratory distress  [Normal Rate] : normal rate  [Regular Rhythm] : with a regular rhythm [Normal S1, S2] : normal S1 and S2 [Soft] : abdomen soft [Non Tender] : non-tender [Non-distended] : non-distended [Coordination Grossly Intact] : coordination grossly intact [Normal Bowel Sounds] : normal bowel sounds [Normal Gait] : normal gait [Normal Affect] : the affect was normal [Normal Insight/Judgement] : insight and judgment were intact

## 2021-01-18 LAB — HBA1C MFR BLD HPLC: 14

## 2021-01-18 NOTE — HISTORY OF PRESENT ILLNESS
[No Pertinent Cardiac History] : no history of aortic stenosis, atrial fibrillation, coronary artery disease, recent myocardial infarction, or implantable device/pacemaker [No Pertinent Pulmonary History] : no history of asthma, COPD, sleep apnea, or smoking [No Adverse Anesthesia Reaction] : no adverse anesthesia reaction in self or family member [Diabetes] : diabetes [(Patient denies any chest pain, claudication, dyspnea on exertion, orthopnea, palpitations or syncope)] : Patient denies any chest pain, claudication, dyspnea on exertion, orthopnea, palpitations or syncope [Excellent (>10 METs)] : Excellent (>10 METs) [FreeTextEntry1] : Cataract Surgery [FreeTextEntry3] : [place] [FreeTextEntry2] : 1/21/21 [FreeTextEntry4] : Patient is a 56 y/o male with medical history of poorly controlled DM2, diabetic neuropathy, depression presenting for preop clearance for cataract surgery on 1/21/21 [FreeTextEntry5] : A1c on 1/16/21: 14 [FreeTextEntry7] : ECG: NS, HR 59\par \par

## 2021-01-18 NOTE — ASSESSMENT
[Patient Optimized for Surgery] : Patient optimized for surgery [No Further Testing Recommended] : no further testing recommended [As per surgery] : as per surgery [FreeTextEntry4] : Patient is a 54 y/o male with a medical history of poorly controlled diabetes, diabetic neuropathy presenting for preop clearance for cataract surgery on 1/21/21 medically optimized for procedure\par \par Papers to be faxed to [facility]

## 2021-01-18 NOTE — REVIEW OF SYSTEMS
[Fever] : no fever [Chills] : no chills [Fatigue] : no fatigue [Chest Pain] : no chest pain [Palpitations] : no palpitations [Shortness Of Breath] : no shortness of breath [Dyspnea on Exertion] : not dyspnea on exertion [Abdominal Pain] : no abdominal pain [Nausea] : no nausea [Headache] : no headache [Vomiting] : no vomiting [Dizziness] : no dizziness

## 2021-01-19 ENCOUNTER — APPOINTMENT (OUTPATIENT)
Dept: OPHTHALMOLOGY | Facility: CLINIC | Age: 56
End: 2021-01-19

## 2021-01-19 ENCOUNTER — OUTPATIENT (OUTPATIENT)
Dept: OUTPATIENT SERVICES | Facility: HOSPITAL | Age: 56
LOS: 1 days | End: 2021-01-19

## 2021-01-19 DIAGNOSIS — Z01.818 ENCOUNTER FOR OTHER PREPROCEDURAL EXAMINATION: ICD-10-CM

## 2021-01-19 DIAGNOSIS — Z20.828 CONTACT WITH AND (SUSPECTED) EXPOSURE TO OTHER VIRAL COMMUNICABLE DISEASES: ICD-10-CM

## 2021-01-19 PROCEDURE — U0003: CPT

## 2021-01-19 PROCEDURE — U0005: CPT

## 2021-01-20 LAB — SARS-COV-2 RNA SPEC QL NAA+PROBE: SIGNIFICANT CHANGE UP

## 2021-01-21 ENCOUNTER — APPOINTMENT (OUTPATIENT)
Dept: OPHTHALMOLOGY | Facility: HOSPITAL | Age: 56
End: 2021-01-21

## 2021-01-21 ENCOUNTER — APPOINTMENT (OUTPATIENT)
Dept: FAMILY MEDICINE | Facility: HOSPITAL | Age: 56
End: 2021-01-21

## 2021-01-21 ENCOUNTER — OUTPATIENT (OUTPATIENT)
Dept: OUTPATIENT SERVICES | Facility: HOSPITAL | Age: 56
LOS: 1 days | End: 2021-01-21

## 2021-01-21 PROCEDURE — 82962 GLUCOSE BLOOD TEST: CPT

## 2021-01-27 DIAGNOSIS — H25.811 COMBINED FORMS OF AGE-RELATED CATARACT, RIGHT EYE: ICD-10-CM

## 2021-02-24 ENCOUNTER — APPOINTMENT (OUTPATIENT)
Dept: FAMILY MEDICINE | Facility: HOSPITAL | Age: 56
End: 2021-02-24

## 2021-02-24 ENCOUNTER — RESULT CHARGE (OUTPATIENT)
Age: 56
End: 2021-02-24

## 2021-02-24 ENCOUNTER — OUTPATIENT (OUTPATIENT)
Dept: OUTPATIENT SERVICES | Facility: HOSPITAL | Age: 56
LOS: 1 days | End: 2021-02-24
Payer: SELF-PAY

## 2021-02-24 ENCOUNTER — NON-APPOINTMENT (OUTPATIENT)
Age: 56
End: 2021-02-24

## 2021-02-24 DIAGNOSIS — Z00.00 ENCOUNTER FOR GENERAL ADULT MEDICAL EXAMINATION WITHOUT ABNORMAL FINDINGS: ICD-10-CM

## 2021-02-24 PROCEDURE — 82043 UR ALBUMIN QUANTITATIVE: CPT

## 2021-02-24 PROCEDURE — G0463: CPT

## 2021-02-24 RX ORDER — GLIPIZIDE 5 MG/1
5 TABLET ORAL
Qty: 15 | Refills: 3 | Status: COMPLETED | COMMUNITY
Start: 2020-10-21 | End: 2021-02-24

## 2021-02-24 NOTE — HISTORY OF PRESENT ILLNESS
[[employer] ] : provided by [employer]   [FreeTextEntry1] : [id number] [FreeTextEntry2] : Rd [TWNoteComboBox1] : Somali [de-identified] : Patient reports being unable to afford his medications. Patient seen with Josefina Shahid, Jaycee Samaniego and Medical Student. Patient says he was taking insulin in the past for one month. Patient frustrated he was not able to get his eye surgery. Patient has noticed increased thirst and polyuria, and also weight loss.

## 2021-02-24 NOTE — REVIEW OF SYSTEMS
[Recent Change In Weight] : ~T recent weight change [Fever] : no fever [Chills] : no chills [Vision Problems] : no vision problems [Chest Pain] : no chest pain [Shortness Of Breath] : no shortness of breath [Wheezing] : no wheezing [Abdominal Pain] : no abdominal pain [Nausea] : no nausea [Vomiting] : no vomiting [Frequency] : no frequency [Joint Pain] : no joint pain [Back Pain] : no back pain [Headache] : no headache [Dizziness] : no dizziness [Anxiety] : no anxiety

## 2021-02-24 NOTE — PLAN
[FreeTextEntry1] : Freestyle Bhavna continuous glucose sensor placed on patient today. Patient educated by Jaycee regarding use. \par patient to return in two weeks for data collection. \par \par As per Josefina Shahid, patient will return Thursday and Friday for education on insulin administration\par \par Humulin ordered for patient today\par \par Patient made aware of food pantry options\par \par Patient to return to clinic two weeks for appointment with Jaycee\par Return to clinic in 4 weeks for visit with Dr. Proctor.  \par \par Patient needs a 60 minute visit.

## 2021-02-24 NOTE — PHYSICAL EXAM
[Normal] : no acute distress, well nourished, well developed and well-appearing [PERRL] : pupils equal round and reactive to light [Normal Outer Ear/Nose] : the outer ears and nose were normal in appearance [No JVD] : no jugular venous distention [No Respiratory Distress] : no respiratory distress  [Clear to Auscultation] : lungs were clear to auscultation bilaterally [Normal Rate] : normal rate  [Regular Rhythm] : with a regular rhythm [Normal S1, S2] : normal S1 and S2 [Right Foot Was Examined] : Right foot ~C was examined [Left Foot Was Examined] : left foot ~C was examined [None] : no ulcers in either foot were found [] : both feet [Soft] : abdomen soft [Non Tender] : non-tender [No HSM] : no HSM [No Spinal Tenderness] : no spinal tenderness [No Joint Swelling] : no joint swelling [de-identified] : skinny, appears mal nourished [de-identified] : has old healing wounds on shins bilaterally

## 2021-02-25 DIAGNOSIS — E11.65 TYPE 2 DIABETES MELLITUS WITH HYPERGLYCEMIA: ICD-10-CM

## 2021-02-25 DIAGNOSIS — E11.40 TYPE 2 DIABETES MELLITUS WITH DIABETIC NEUROPATHY, UNSPECIFIED: ICD-10-CM

## 2021-03-03 LAB
BILIRUB UR QL STRIP: NORMAL
CLARITY UR: CLEAR
CREAT SPEC-SCNC: 83 MG/DL
GLUCOSE BLDC GLUCOMTR-MCNC: 351
GLUCOSE UR-MCNC: 1000
HCG UR QL: 0.2 EU/DL
HGB UR QL STRIP.AUTO: NORMAL
KETONES UR-MCNC: NORMAL
LEUKOCYTE ESTERASE UR QL STRIP: NORMAL
MICROALBUMIN 24H UR DL<=1MG/L-MCNC: <1.2 MG/DL
MICROALBUMIN/CREAT 24H UR-RTO: NORMAL MG/G
NITRITE UR QL STRIP: NORMAL
PH UR STRIP: 5.5
PROT UR STRIP-MCNC: NORMAL
SP GR UR STRIP: 1.02

## 2021-03-10 ENCOUNTER — NON-APPOINTMENT (OUTPATIENT)
Age: 56
End: 2021-03-10

## 2021-03-16 ENCOUNTER — NON-APPOINTMENT (OUTPATIENT)
Age: 56
End: 2021-03-16

## 2021-03-17 ENCOUNTER — APPOINTMENT (OUTPATIENT)
Dept: FAMILY MEDICINE | Facility: HOSPITAL | Age: 56
End: 2021-03-17

## 2021-03-17 RX ORDER — INSULIN HUMAN 100 [IU]/ML
(70-30) 100 INJECTION, SUSPENSION SUBCUTANEOUS
Qty: 1 | Refills: 3 | Status: COMPLETED | COMMUNITY
Start: 2021-02-24 | End: 2021-03-17

## 2021-03-18 ENCOUNTER — OUTPATIENT (OUTPATIENT)
Dept: OUTPATIENT SERVICES | Facility: HOSPITAL | Age: 56
LOS: 1 days | End: 2021-03-18
Payer: SELF-PAY

## 2021-03-18 ENCOUNTER — APPOINTMENT (OUTPATIENT)
Dept: FAMILY MEDICINE | Facility: HOSPITAL | Age: 56
End: 2021-03-18

## 2021-03-18 VITALS
RESPIRATION RATE: 14 BRPM | TEMPERATURE: 97.6 F | BODY MASS INDEX: 22.02 KG/M2 | DIASTOLIC BLOOD PRESSURE: 72 MMHG | SYSTOLIC BLOOD PRESSURE: 109 MMHG | WEIGHT: 129 LBS | OXYGEN SATURATION: 98 % | HEART RATE: 79 BPM | HEIGHT: 64 IN

## 2021-03-18 DIAGNOSIS — Z00.00 ENCOUNTER FOR GENERAL ADULT MEDICAL EXAMINATION WITHOUT ABNORMAL FINDINGS: ICD-10-CM

## 2021-03-18 DIAGNOSIS — Z00.00 ENCOUNTER FOR GENERAL ADULT MEDICAL EXAMINATION W/OUT ABNORMAL FINDINGS: ICD-10-CM

## 2021-03-18 DIAGNOSIS — E11.40 TYPE 2 DIABETES MELLITUS WITH DIABETIC NEUROPATHY, UNSPECIFIED: ICD-10-CM

## 2021-03-18 DIAGNOSIS — E11.65 TYPE 2 DIABETES MELLITUS WITH HYPERGLYCEMIA: ICD-10-CM

## 2021-03-18 PROCEDURE — G0463: CPT

## 2021-03-18 RX ORDER — BLOOD-GLUCOSE METER
KIT MISCELLANEOUS
Qty: 1 | Refills: 0 | Status: DISCONTINUED | COMMUNITY
Start: 2019-01-25 | End: 2021-03-18

## 2021-03-18 RX ORDER — BLOOD-GLUCOSE METER
W/DEVICE EACH MISCELLANEOUS
Qty: 1 | Refills: 0 | Status: DISCONTINUED | COMMUNITY
Start: 2019-01-25 | End: 2021-03-18

## 2021-03-18 NOTE — PHYSICAL EXAM
[No Acute Distress] : no acute distress [Well-Appearing] : well-appearing [Normal Sclera/Conjunctiva] : normal sclera/conjunctiva [PERRL] : pupils equal round and reactive to light [No JVD] : no jugular venous distention [No Respiratory Distress] : no respiratory distress  [No Accessory Muscle Use] : no accessory muscle use [Clear to Auscultation] : lungs were clear to auscultation bilaterally [Normal Rate] : normal rate  [Regular Rhythm] : with a regular rhythm [Normal S1, S2] : normal S1 and S2 [Pedal Pulses Present] : the pedal pulses are present [No Edema] : there was no peripheral edema [No Extremity Clubbing/Cyanosis] : no extremity clubbing/cyanosis [Soft] : abdomen soft [Non Tender] : non-tender [Normal Bowel Sounds] : normal bowel sounds [No CVA Tenderness] : no CVA  tenderness [No Joint Swelling] : no joint swelling [Grossly Normal Strength/Tone] : grossly normal strength/tone [No Rash] : no rash [Coordination Grossly Intact] : coordination grossly intact [Normal Gait] : normal gait [Comprehensive Foot Exam Normal] : Right and left foot were examined and both feet are normal. No ulcers in either foot. Toes are normal and with full ROM.  Normal tactile sensation with monofilament testing throughout both feet

## 2021-03-18 NOTE — REVIEW OF SYSTEMS
[Vision Problems] : vision problems [Fever] : no fever [Chills] : no chills [Fatigue] : no fatigue [Hot Flashes] : no hot flashes [Discharge] : no discharge [Pain] : no pain [Earache] : no earache [Chest Pain] : no chest pain [Palpitations] : no palpitations [Shortness Of Breath] : no shortness of breath [Wheezing] : no wheezing [Cough] : no cough [Abdominal Pain] : no abdominal pain [Nausea] : no nausea [Constipation] : no constipation [Dysuria] : no dysuria [Incontinence] : no incontinence [Hesitancy] : no hesitancy [Joint Pain] : no joint pain [Joint Stiffness] : no joint stiffness [Muscle Pain] : no muscle pain [Muscle Weakness] : no muscle weakness [Itching] : no itching [Mole Changes] : no mole changes [Headache] : no headache [Dizziness] : no dizziness [Fainting] : no fainting [Confusion] : no confusion [Suicidal] : not suicidal [Insomnia] : no insomnia [Anxiety] : no anxiety [Depression] : no depression [Easy Bleeding] : no easy bleeding [Easy Bruising] : no easy bruising

## 2021-03-18 NOTE — HISTORY OF PRESENT ILLNESS
[[employer] ] : provided by [employer]   [FreeTextEntry1] : [id number] [FreeTextEntry2] : Jose [TWNoteComboBox1] : Gabonese [de-identified] : 54 y/o M with hx of diabetes recently uncontrolled. Patient notes that he continues to have blurry vision but denies history of chest pain, falls, and any recent wounds. Patient feels improved with using machine to check his blood glucose. Patient says his pain in his foot has improved bilaterally. Patient denies hypoglycemic episodes since the last appointment.

## 2021-03-18 NOTE — PLAN
[FreeTextEntry1] : #DIabetes Mellitus\par Patient to go to [facility] for his glucose machine \par Says he has been taking his Insulin with no issues, denies hypoglycemic events\par b/l foot pain improved and resolved\par Patient to  glucose reading machine at [facility] \par Novolin renewed at Diabetes Educator appt along with Metformin\par \par #Eye Surgery\par patient wants to get surgery once A1C goes down. \par \par #Anxiety\par Fluoxetine renewal\par \par #HLD\par statin renewed\par \par #Chronic pain\par Renew gabapentin \par \par #Return to clinic\par Return to clinic in one month for A1C check.\par \par Discussed with Dr. El

## 2021-03-22 NOTE — HISTORY OF PRESENT ILLNESS
[FreeTextEntry1] : HERE TODAY FOR DIABETES FOLLOW UP\par FEELS WELL.  REPORTS MUCH MORE ENERGY, IS NOW ABLE TO EAT WITH LESS WORRY OF HIGH BLOOD SUGARS AND FEELS GRATEFUL IS FEELING WELL AGAIN\par DENIES POLYURIA, POLYDIPSIA \par REPORTS COMPLIANCE W/ NOVOLIN 70/30  10 UNITS BEFORE BREAKFAST AND  DINNER \par .REPORTS COMPLIANCE W/ METFORMIN 1,000 MG BEFORE BREAKFAST AND DINNER \par WORE SAMPLE CGM FOR 14 DAYS\par CGM DOWNLOAD\par BG AVERAGE  \par WORN FOR 14 DAYS\par BG    \par         < 54         0%\par          54-69      1%\par              37%\par         181-250   26%\par          > 250       36%

## 2021-03-22 NOTE — ASSESSMENT
[FreeTextEntry1] : CONGRATULATED ON INJECTING HIS INSULIN REGULARLY WHICH IS GREATLY HELPING DECREASE BG\par INSTRUCTED TO INCREASE NOVOLIN 70/30 FROM 10 TO 12 UNITS BEFORE BREAKFAST AND  DINNER\par INSTRUCTED NOT TO TAKE IF WILL NOT BE EATING WITH 30 MINUTES \par CONTINUE METFORMIN\par PT WOULD LIKE TO CONTINUE TO USE FLOR CGM AND IS WILLING TO PAY OUT OF POCKET.  INSTRUCTED TO PERFORM FINGER STICKS (METER AND 30 DAYS OF STRIPS GIVEN) UNTIL ABLE TO OBTAIN FLOR\par .INSTRUCTED TO CALL OFFICE FOR BG < 70, > 250 OR FOR ANY QUESTIONS OR CONCERNS \par WILL CALL OFFICE TO SCHEDULE TRAINING WHEN HE OBTAINS FLOR [Diabetes Foot Care] : diabetes foot care [Long Term Vascular Complications] : long term vascular complications of diabetes [Carbohydrate Consistent Diet] : carbohydrate consistent diet [Importance of Diet and Exercise] : importance of diet and exercise to improve glycemic control, achieve weight loss and improve cardiovascular health [Exercise/Effect on Glucose] : exercise/effect on glucose [Hypoglycemia Management] : hypoglycemia management [Action and use of Insulin] : action and use of short and long-acting insulin [Self Monitoring of Blood Glucose] : self monitoring of blood glucose [Insulin Self-Administration] : insulin self-administration [Injection Technique, Storage, Sharps Disposal] : injection technique, storage, and sharps disposal [Sick-Day Management] : sick-day management [Retinopathy Screening] : Patient was referred to ophthalmology for retinopathy screening

## 2021-03-22 NOTE — PHYSICAL EXAM
[Alert] : alert [No Acute Distress] : no acute distress [No Respiratory Distress] : no respiratory distress [Normal Gait] : normal gait [Foot Ulcers] : no foot ulcers

## 2021-03-22 NOTE — REVIEW OF SYSTEMS
[Fatigue] : no fatigue [Nausea] : no nausea [Vomiting] : no vomiting [Gas/Bloating] : no gas/bloating [Joint Pain] : no joint pain [Headaches] : no headaches [Polydipsia] : no polydipsia

## 2021-03-23 ENCOUNTER — NON-APPOINTMENT (OUTPATIENT)
Age: 56
End: 2021-03-23

## 2021-03-31 ENCOUNTER — APPOINTMENT (OUTPATIENT)
Dept: FAMILY MEDICINE | Facility: HOSPITAL | Age: 56
End: 2021-03-31

## 2021-04-02 NOTE — REASON FOR VISIT
[Follow - Up] : a follow-up visit [DM Type 2] : DM Type 2 [[employer] ] : provided by [employer]   [FreeTextEntry1] : [id number] [FreeTextEntry2] : JALEESA [TWNoteComboBox1] : [place] Islander

## 2021-04-02 NOTE — REVIEW OF SYSTEMS
[Fatigue] : no fatigue [Blurred Vision] : no blurred vision [Shortness Of Breath] : no shortness of breath [Nausea] : no nausea [Diarrhea] : no diarrhea [Gas/Bloating] : no gas/bloating [Polydipsia] : no polydipsia [Polyuria] : no polyuria

## 2021-04-02 NOTE — PHYSICAL EXAM
[Alert] : alert [No Acute Distress] : no acute distress [No Respiratory Distress] : no respiratory distress [Oriented x3] : oriented to person, place, and time [Recent Memory Normal] : recent memory was not impaired [Normal Affect] : the affect was normal [Normal Insight/Judgement] : insight and judgment were intact [Normal Mood] : the mood was normal [Remote Memory Normal] : remote memory was not impaired

## 2021-04-02 NOTE — HISTORY OF PRESENT ILLNESS
[FreeTextEntry1] : PT ARRIVED TODAY WITHOUT SCHEDULED APPOINTMENT REQUESTING INSTRUCTION ON FLOR CGM\par FEELS WELL.  DENIES POLYURIA, POLYDIPSIA OR UNINTENTIONAL WEIGHT LOSS SINCE LAST VISIT\par .REPORTS COMPLIANCE W/ METFORMIN 1,000 MG BEFORE BREAKFAST AND DINNER \par REPORTS COMPLIANCE W/ NOVOLIN 70/30  12 UNITS BEFORE BREAKFAST AND DINNER \par NO READINGS < 70 OR SIGNS AND SYMPTOMS OF HYPOGLYCEMIA\par PT HAS WORN SAMPLE FLOR CGM AND REFERRED THIS TO PERFORMING FINGER STICKS\par NEEDS OPTHO

## 2021-04-02 NOTE — ASSESSMENT
[FreeTextEntry1] : PT WAS ABLE TO SUCCESSFULLY APPLY FLOR PERSONAL CGM.  PT WILL USE READER TO SCAN BG\par EDUCATED ON NEED TO SCAN AT LEAST QH 8 TO AVOID GAPS IN DATA\par INSTRUCTED TO REMOVE PRIOR TO ANY X-RAY, MRI OR CT SCAN\par EXPLAINED THAT CGM IS TESTING INTERSTITIAL FLUID, RATHER THAN THE CAPILLARY BLOOD MEASURED WHEN PT PERFORMS A FINGERSTICK\par INSTRUCTED TO VERIFY ANY RESULTS THAT SEEM INACCURATE OR IF HAVING FEELINGS OF LOW BG BY PERFORMING FINGERSTICK\par EDUCATED ON SIGNS AND SYMPTOMS OF SITE INFECTION AND IMPORTANCE OF REMOVING SENSOR AND CALLING OFFICE ASAP \par .INSTRUCTED TO CALL OFFICE FOR BG < 70, > 250 OR FOR ANY QUESTIONS OR CONCERNS \par SCHEDULED TO RETURN TO OFFICE ON 4/26/21 [Diabetes Foot Care] : diabetes foot care [Long Term Vascular Complications] : long term vascular complications of diabetes [Carbohydrate Consistent Diet] : carbohydrate consistent diet [Importance of Diet and Exercise] : importance of diet and exercise to improve glycemic control, achieve weight loss and improve cardiovascular health [Exercise/Effect on Glucose] : exercise/effect on glucose [Hypoglycemia Management] : hypoglycemia management [Action and use of Insulin] : action and use of short and long-acting insulin [Insulin Self-Administration] : insulin self-administration [Self Monitoring of Blood Glucose] : self monitoring of blood glucose [Injection Technique, Storage, Sharps Disposal] : injection technique, storage, and sharps disposal [Retinopathy Screening] : Patient was referred to ophthalmology for retinopathy screening

## 2021-04-14 ENCOUNTER — NON-APPOINTMENT (OUTPATIENT)
Age: 56
End: 2021-04-14

## 2021-05-03 NOTE — ED PROVIDER NOTE - TELEPHONIC ID NUMBER OF THE INTERPRETER
I have not heard of him, does not mean is not very good  I am not sure how much female incontinence issues he deals with.  Generally I refer females with incontinence issues to urogynecologist, physicians or have dual training with urology and gynecology and this is all they do   [id number]

## 2021-05-14 ENCOUNTER — NON-APPOINTMENT (OUTPATIENT)
Age: 56
End: 2021-05-14

## 2021-05-17 ENCOUNTER — NON-APPOINTMENT (OUTPATIENT)
Age: 56
End: 2021-05-17

## 2021-05-17 ENCOUNTER — RESULT CHARGE (OUTPATIENT)
Age: 56
End: 2021-05-17

## 2021-05-17 ENCOUNTER — APPOINTMENT (OUTPATIENT)
Dept: FAMILY MEDICINE | Facility: HOSPITAL | Age: 56
End: 2021-05-17

## 2021-05-17 VITALS — WEIGHT: 138.3 LBS | BODY MASS INDEX: 23.74 KG/M2

## 2021-05-17 DIAGNOSIS — E11.65 TYPE 2 DIABETES MELLITUS WITH HYPERGLYCEMIA: ICD-10-CM

## 2021-05-17 NOTE — PHYSICAL EXAM
[Alert] : alert [No Respiratory Distress] : no respiratory distress [Normal Gait] : normal gait [No Rash] : no rash [Foot Ulcers] : no foot ulcers [Right Foot Was Examined] : right foot ~C was examined [Left Foot Was Examined] : left foot ~C was examined [Normal] : normal [2+] : 2+ in the dorsalis pedis [Diminished Throughout Both Feet] : normal tactile sensation with monofilament testing throughout both feet

## 2021-05-17 NOTE — REVIEW OF SYSTEMS
[Fatigue] : no fatigue [Decreased Appetite] : appetite not decreased [Blurred Vision] : blurred vision [Shortness Of Breath] : no shortness of breath [Nausea] : no nausea [Diarrhea] : no diarrhea [Gas/Bloating] : no gas/bloating [Polyuria] : no polyuria [Joint Pain] : no joint pain [Pain/Numbness of Digits] : pain/numbness of digits [Polydipsia] : no polydipsia [de-identified] : REPORTS BURNING AND TINGLING OF B/L FEET

## 2021-05-17 NOTE — HISTORY OF PRESENT ILLNESS
[FreeTextEntry1] : HERE TODAY FOR DIABETES FOLLOW UP\par DENIES POLYURIA, POLYDIPSIA OR UNINTENTIONAL WEIGHT LOSS \par REPORTS FEELING SO MUCH BETTER THAN HE DID MONTHS AGO.  MORE ENERGY AND HAS BEEN ABLE TO GAIN WEIGHT\par .REPORTS COMPLIANCE W/ METFORMIN 1,000 MG BEFORE BREAKFAST AND DINNER\par REPORTS COMPLIANCE W/ NOVOLIN 70/30   12 UNITS BEFORE BREAKFAST AND  DINNER \par NO READINGS < 70 OR SIGNS AND SYMPTOMS OF HYPOGLYCEMIA\par WAS WEARING FLOR CGM BUT THEN STATED WHEN HE APPLIED NEW SENSOR, IT NEVER WORKED.  DID NOT BRING READER FOR DOWNLOAD\par DENIES SIGNS AND SYMPTOMS OF HYPOGLYCEMIA\par HAS NOT BEEN TESTING BD\par NEEDS OPTHO\par REPORTS ITCHY RED MACULAR RASH ON FACE, MOSTLY ON FOREHEAD.  DENIES COMING IN CONTACT WITH ANY NEW SOAPS, LOTIONS, DETERGENT OR PLANTS.  STATES RASH IS ONLY ON FACE.  DENIES FEVER, CHILLS OR BODY ACHES\par

## 2021-05-17 NOTE — ASSESSMENT
[FreeTextEntry1] : A1C TODAY 6.9% DOWN FROM 14%\par WEIGHT UP 9 LBS.  DUE TO BETTER BG CONTROL\par PT WAS CONGRATULATED ON GETTING DIABETES UNDER CONTROL\par INSTRUCTED TO DECREASE NOVOLIN 70/30 FROM 12 TO 10 UNITS W/ BREAKFAST AND DINNER\par CONTINUE METFORMIN 1,000 MG BID\par PT WAS ABLE TO "TEACH BACK" ALL INSTRUCTIONS \par APPT OFFERED FOR LATER TODAY TO ASSESS FACIAL RASH BUT PT UNABLE TO ATTEND. SCHEDULED FOR 5/20/21.  INSTRUCTED TO CALL OFFICE IF RASH WORSENS\par DIABETES FOLLOW UP AS NEEDED [Diabetes Foot Care] : diabetes foot care [Carbohydrate Consistent Diet] : carbohydrate consistent diet [Hypoglycemia Management] : hypoglycemia management

## 2021-05-18 LAB — HBA1C MFR BLD HPLC: 6.9

## 2021-05-20 ENCOUNTER — APPOINTMENT (OUTPATIENT)
Dept: FAMILY MEDICINE | Facility: HOSPITAL | Age: 56
End: 2021-05-20

## 2021-05-24 ENCOUNTER — NON-APPOINTMENT (OUTPATIENT)
Age: 56
End: 2021-05-24

## 2021-07-02 ENCOUNTER — APPOINTMENT (OUTPATIENT)
Dept: FAMILY MEDICINE | Facility: HOSPITAL | Age: 56
End: 2021-07-02

## 2021-07-02 ENCOUNTER — NON-APPOINTMENT (OUTPATIENT)
Age: 56
End: 2021-07-02

## 2021-08-05 ENCOUNTER — APPOINTMENT (OUTPATIENT)
Dept: FAMILY MEDICINE | Facility: HOSPITAL | Age: 56
End: 2021-08-05

## 2021-08-05 ENCOUNTER — RESULT CHARGE (OUTPATIENT)
Age: 56
End: 2021-08-05

## 2021-08-05 ENCOUNTER — OUTPATIENT (OUTPATIENT)
Dept: OUTPATIENT SERVICES | Facility: HOSPITAL | Age: 56
LOS: 1 days | End: 2021-08-05
Payer: SELF-PAY

## 2021-08-05 VITALS
BODY MASS INDEX: 24.24 KG/M2 | HEIGHT: 64 IN | RESPIRATION RATE: 14 BRPM | DIASTOLIC BLOOD PRESSURE: 74 MMHG | WEIGHT: 142 LBS | HEART RATE: 62 BPM | TEMPERATURE: 96.9 F | OXYGEN SATURATION: 98 % | SYSTOLIC BLOOD PRESSURE: 118 MMHG

## 2021-08-05 DIAGNOSIS — Z00.00 ENCOUNTER FOR GENERAL ADULT MEDICAL EXAMINATION WITHOUT ABNORMAL FINDINGS: ICD-10-CM

## 2021-08-05 PROCEDURE — G0463: CPT

## 2021-08-11 LAB — HBA1C MFR BLD HPLC: 6.8

## 2021-08-11 NOTE — ASSESSMENT
[FreeTextEntry1] : \par 55M with an active medical history of T2DM complicated by Diabetic Neuropathy who presents to clinic today for his routine DM management, with A1C measured to be at goal, 6.8%. \par \par \par \par #T2DM complicated by Diabetic Neuropathy \par -Last visit for DM management on \par -POCT A1C: 6.8%  (From 6.9% on 5/21) \par -Continue with 70/30 Novolog 10 Units BID \par -Continue with Metformin 1000mg BID \par -Continue with Simvistatin 20mg qD\par -Contnue with Gabapentin 100mg qHs\par -Optho Referral given prior\par -Podiatry Referral given prior\par -RTC in 3m for DM follow up \par -DM Foot exam consistent with diabetic neuropathy \par -Pt strongly advised to monitor Carbohydrate intake \par -Diet and Exercise strongly encouraged \par -Pt expressed under standing to the above\par -Hypoglycemia protocol reviewed  \par \par \par \par #COVID-19 Precautions \par -Pt assessed for Eligibility for COVID Vaccine  \par -Practice Social Distancing \par -Practice hand hygiene \par -Continue with surgical Mask PPE \par -Contact the clinic with Sxs concerning for COVID \par \par #Health Care Maintenance \par -Scheduled an appointment for annual CPE in 2 weeks \par -Obtain the following labs at that time: CBC, CMP, TSH w/ reflex T4, Lipid Panel, VItamin D\par -S/p Hep B vaccine\par -STD testing to be Offered: HIV, Hep B, Syphilis, GC/Chlam Urine- deferred today \par -FIT: Last assessed 9/2020: Negative \par \par #CSP \par -CSP Eligible: Yes\par -Schedule Next apt for CSP where FIT will be performed \par -Next CSP after 9/17/21\par \par \par #Future \par -RTC in 2 weeks for routine follow up with PCP, where the following issues may be addressed: CPE, cataracts  \par \par \par Case discussed with Dr. VALENTE\par \par

## 2021-08-11 NOTE — HISTORY OF PRESENT ILLNESS
[Diabetes Mellitus] : Diabetes Mellitus [Patient was last seen on ___] : Patient was last seen on [unfilled] [No episodes] : No hypoglycemic episodes since the last visit. [Check glucose ___ x/day] : Patient checks  blood glucose [unfilled]  times a day [FreeTextEntry6] : \par 55M with a PMH significant for T2DM currently managed on Insulin complicated by Diabetic Neuropathy, who presents today for routine DM follow-up. Pt states that he has been compliant with his DM regimen and is currently out of his medications. He reports checking his Blood glucose daily, with readings averaging between 100-140's. Pt states that his diet over the last few months have consisted of minimal carbs and sugar. Pt also reports minimal exercise at least 2x weekly. He denies episodes of hypoglycemia (diaphoresis, tremors, lightheadedness) over these last few months, he also denies polyuria, polydipsia, changes in vision, Chest pain, HA, loss of sensation, with remaining ROS unremarkable.\par \par

## 2021-08-15 DIAGNOSIS — E11.40 TYPE 2 DIABETES MELLITUS WITH DIABETIC NEUROPATHY, UNSPECIFIED: ICD-10-CM

## 2021-08-15 DIAGNOSIS — H26.9 UNSPECIFIED CATARACT: ICD-10-CM

## 2021-08-15 DIAGNOSIS — E11.49 TYPE 2 DIABETES MELLITUS WITH OTHER DIABETIC NEUROLOGICAL COMPLICATION: ICD-10-CM

## 2021-10-27 ENCOUNTER — RESULT REVIEW (OUTPATIENT)
Age: 56
End: 2021-10-27

## 2021-10-27 ENCOUNTER — NON-APPOINTMENT (OUTPATIENT)
Age: 56
End: 2021-10-27

## 2021-10-27 ENCOUNTER — OUTPATIENT (OUTPATIENT)
Dept: OUTPATIENT SERVICES | Facility: HOSPITAL | Age: 56
LOS: 1 days | End: 2021-10-27
Payer: SELF-PAY

## 2021-10-27 ENCOUNTER — APPOINTMENT (OUTPATIENT)
Dept: FAMILY MEDICINE | Facility: HOSPITAL | Age: 56
End: 2021-10-27

## 2021-10-27 ENCOUNTER — RESULT CHARGE (OUTPATIENT)
Age: 56
End: 2021-10-27

## 2021-10-27 VITALS
TEMPERATURE: 98 F | HEART RATE: 97 BPM | SYSTOLIC BLOOD PRESSURE: 120 MMHG | WEIGHT: 136 LBS | DIASTOLIC BLOOD PRESSURE: 78 MMHG | BODY MASS INDEX: 23.34 KG/M2 | RESPIRATION RATE: 14 BRPM | OXYGEN SATURATION: 95 %

## 2021-10-27 DIAGNOSIS — Z00.00 ENCOUNTER FOR GENERAL ADULT MEDICAL EXAMINATION WITHOUT ABNORMAL FINDINGS: ICD-10-CM

## 2021-10-27 PROCEDURE — G0463: CPT

## 2021-10-27 PROCEDURE — 73590 X-RAY EXAM OF LOWER LEG: CPT | Mod: 26,LT

## 2021-10-27 PROCEDURE — 73590 X-RAY EXAM OF LOWER LEG: CPT

## 2021-10-28 DIAGNOSIS — L71.9 ROSACEA, UNSPECIFIED: ICD-10-CM

## 2021-10-28 DIAGNOSIS — E11.49 TYPE 2 DIABETES MELLITUS WITH OTHER DIABETIC NEUROLOGICAL COMPLICATION: ICD-10-CM

## 2021-10-28 DIAGNOSIS — S80.12XA CONTUSION OF LEFT LOWER LEG, INITIAL ENCOUNTER: ICD-10-CM

## 2021-10-28 NOTE — HISTORY OF PRESENT ILLNESS
[FreeTextEntry8] : 57 y/o M w/ PMHx of DM, cataracts presents to clinic for evaluation after being seen by SW: Josefina Shahid for left leg pain and facial rash., Patient reports of trauma to the left lower leg. Patient states he was walking when he hit the bench about three days ago. Current rates the pain at seven out of 10 in intensity non-radiating in nature. Patient states he's been taking Tylenol for pain. Patient also complains of facial rash for six months. States the rash is non-pruritic in nature. Denies new products/drugs/soaps/detergent. Denies arthralgias. \par Patient is also non-compliant with his insulin. Patient was advised to take Novolin 70/30: 15u in AM and 15u in PM. Currently only taking 15u daily. States he cannot take the insulin because of financial cost and he's not able to afford the medications. Patient was scheduled to have cataract surgery but was cancelled as he's unable to bear the financial cost.

## 2021-10-28 NOTE — ASSESSMENT
[FreeTextEntry1] : 55 y/o M w/ PMHx of DM, cataracts presents to clinic for evaluation for facial rash x6 months and LLE pain x3 days \par \par \par #LLE pain due to Left tibial contusion \par -s/p trauma x3 days ago \par -XR tib/fib: no fx (discussed with radiologist) \par -RICE \par -Tylenol PRN for pain \par -fall precautions discussed \par \par #Rosacea \par -Topical Metronidazole PRN \par -avoid stressors \par -pt currently on Gabapentin, unsure if that might be contributing to the rosacea. Will continue for now as pt with significant diabetic neuropathy \par \par #DM \par -A1c:12 \par -NON-COMPLIANT with insulin. Currently only talking 15u/day \par -c/w Novolin 70/30: 15u in AM and PM\par -c/w Metformin 1000mg BID \par -c/wSimvastatin 20mg qHS, will need to change to moderate strength statin. Patient currently not able to afford the medications. Will c/w with Simvastatin for now. \par -c/w Gabapentin 100mg \par -Recommend moderate-intensity physical activity \par -Counselled on low-calorie meals \par -Avoid nutritive sweeteners such as sucrose. May use non-nutritive sweetners \par -Encourage weight loss \par -Recommend daily glucose logs \par -Encourage compliance with medications \par \par \par #HCM\par -flu vaccine deferred \par -needs colon cancer screening. \par \par \par #RTC in 1 month for f/u \par \par \par Case and plan discussed with Dr. Osiris Palafox \par \par

## 2021-10-28 NOTE — REVIEW OF SYSTEMS
[Vision Problems] : vision problems [Skin Rash] : skin rash [Negative] : Heme/Lymph [FreeTextEntry9] : (+)LLE pain

## 2021-10-28 NOTE — PHYSICAL EXAM
[No Acute Distress] : no acute distress [PERRL] : pupils equal round and reactive to light [EOMI] : extraocular movements intact [No Focal Deficits] : no focal deficits [Normal] : affect was normal and insight and judgment were intact [Comprehensive Foot Exam Normal] : Right and left foot were examined and both feet are normal. No ulcers in either foot. Toes are normal and with full ROM.  Normal tactile sensation with monofilament testing throughout both feet [de-identified] : (+)solmolent [de-identified] : (+) b/l varicose veins noted  [de-identified] : (+)facial erythema noted in the malar pattern and over the forehead (+)telangiectasias noted  [de-identified] : (+)antalgic gait noted

## 2021-11-01 LAB — HBA1C MFR BLD HPLC: 12

## 2021-11-15 ENCOUNTER — APPOINTMENT (OUTPATIENT)
Dept: OPHTHALMOLOGY | Facility: CLINIC | Age: 56
End: 2021-11-15
Payer: SELF-PAY

## 2021-11-15 ENCOUNTER — NON-APPOINTMENT (OUTPATIENT)
Age: 56
End: 2021-11-15

## 2021-11-15 PROCEDURE — 92014 COMPRE OPH EXAM EST PT 1/>: CPT

## 2021-11-22 ENCOUNTER — APPOINTMENT (OUTPATIENT)
Dept: OPHTHALMOLOGY | Facility: CLINIC | Age: 56
End: 2021-11-22

## 2021-11-29 ENCOUNTER — NON-APPOINTMENT (OUTPATIENT)
Age: 56
End: 2021-11-29

## 2021-11-29 ENCOUNTER — APPOINTMENT (OUTPATIENT)
Dept: OPHTHALMOLOGY | Facility: CLINIC | Age: 56
End: 2021-11-29
Payer: SELF-PAY

## 2021-11-29 PROCEDURE — 92012 INTRM OPH EXAM EST PATIENT: CPT

## 2021-12-13 ENCOUNTER — APPOINTMENT (OUTPATIENT)
Dept: OPHTHALMOLOGY | Facility: CLINIC | Age: 56
End: 2021-12-13
Payer: SELF-PAY

## 2021-12-13 ENCOUNTER — NON-APPOINTMENT (OUTPATIENT)
Age: 56
End: 2021-12-13

## 2021-12-13 PROCEDURE — 92014 COMPRE OPH EXAM EST PT 1/>: CPT

## 2022-01-14 ENCOUNTER — APPOINTMENT (OUTPATIENT)
Dept: OPHTHALMOLOGY | Facility: CLINIC | Age: 57
End: 2022-01-14
Payer: MEDICAID

## 2022-01-14 ENCOUNTER — NON-APPOINTMENT (OUTPATIENT)
Age: 57
End: 2022-01-14

## 2022-01-14 PROCEDURE — 92012 INTRM OPH EXAM EST PATIENT: CPT

## 2022-02-28 ENCOUNTER — APPOINTMENT (OUTPATIENT)
Dept: OPHTHALMOLOGY | Facility: CLINIC | Age: 57
End: 2022-02-28
Payer: COMMERCIAL

## 2022-02-28 ENCOUNTER — NON-APPOINTMENT (OUTPATIENT)
Age: 57
End: 2022-02-28

## 2022-02-28 PROCEDURE — 92012 INTRM OPH EXAM EST PATIENT: CPT

## 2022-03-21 ENCOUNTER — NON-APPOINTMENT (OUTPATIENT)
Age: 57
End: 2022-03-21

## 2022-03-21 ENCOUNTER — APPOINTMENT (OUTPATIENT)
Dept: OPHTHALMOLOGY | Facility: CLINIC | Age: 57
End: 2022-03-21
Payer: MEDICAID

## 2022-03-21 PROCEDURE — 92136 OPHTHALMIC BIOMETRY: CPT

## 2022-03-21 PROCEDURE — 92014 COMPRE OPH EXAM EST PT 1/>: CPT

## 2022-03-24 ENCOUNTER — RESULT CHARGE (OUTPATIENT)
Age: 57
End: 2022-03-24

## 2022-03-25 ENCOUNTER — APPOINTMENT (OUTPATIENT)
Dept: FAMILY MEDICINE | Facility: HOSPITAL | Age: 57
End: 2022-03-25
Payer: COMMERCIAL

## 2022-03-25 ENCOUNTER — OUTPATIENT (OUTPATIENT)
Dept: OUTPATIENT SERVICES | Facility: HOSPITAL | Age: 57
LOS: 1 days | End: 2022-03-25
Payer: SELF-PAY

## 2022-03-25 VITALS
RESPIRATION RATE: 14 BRPM | HEART RATE: 63 BPM | BODY MASS INDEX: 22.49 KG/M2 | TEMPERATURE: 97.6 F | SYSTOLIC BLOOD PRESSURE: 102 MMHG | WEIGHT: 131 LBS | DIASTOLIC BLOOD PRESSURE: 71 MMHG | OXYGEN SATURATION: 99 %

## 2022-03-25 DIAGNOSIS — Z00.00 ENCOUNTER FOR GENERAL ADULT MEDICAL EXAMINATION WITHOUT ABNORMAL FINDINGS: ICD-10-CM

## 2022-03-25 PROCEDURE — 93010 ELECTROCARDIOGRAM REPORT: CPT | Mod: NC

## 2022-03-25 PROCEDURE — G0463: CPT

## 2022-03-25 PROCEDURE — 93005 ELECTROCARDIOGRAM TRACING: CPT

## 2022-03-25 PROCEDURE — 82043 UR ALBUMIN QUANTITATIVE: CPT

## 2022-03-25 RX ORDER — LANCING DEVICE/LANCETS
KIT MISCELLANEOUS
Qty: 1 | Refills: 5 | Status: ACTIVE | COMMUNITY
Start: 2022-03-25 | End: 1900-01-01

## 2022-03-25 NOTE — REVIEW OF SYSTEMS
[Vision Problems] : vision problems [Fever] : no fever [Discharge] : no discharge [Earache] : no earache [Chest Pain] : no chest pain [Shortness Of Breath] : no shortness of breath [Wheezing] : no wheezing [Cough] : no cough [Anxiety] : no anxiety [Depression] : no depression

## 2022-03-25 NOTE — PLAN
[FreeTextEntry1] : #Pre-op Clearance\par EKG performed\par Plan for cataract surgery\par RCRI Class II, risk of post op complications is 6.0%\par Not cleared for surgery due to A1C of 13.3\par \par #DM type 2\par Patient unable to afford medications\par Explained to patient that when that happens he needs to come in for a visit, so we can help find him help\par Novolin renewed\par Metformin renewed\par Atorvastatin renewed\par Micro Albumin \par Increase Insulin to 20 units BID\par \par RTC in 6 weeks for f/u visit DM \par \par Discussed with Dr. Zurita

## 2022-03-25 NOTE — HISTORY OF PRESENT ILLNESS
[No Pertinent Cardiac History] : no history of aortic stenosis, atrial fibrillation, coronary artery disease, recent myocardial infarction, or implantable device/pacemaker [No Pertinent Pulmonary History] : no history of asthma, COPD, sleep apnea, or smoking [No Adverse Anesthesia Reaction] : no adverse anesthesia reaction in self or family member [Diabetes] : diabetes [(Patient denies any chest pain, claudication, dyspnea on exertion, orthopnea, palpitations or syncope)] : Patient denies any chest pain, claudication, dyspnea on exertion, orthopnea, palpitations or syncope [Good (7-10 METs)] : Good (7-10 METs) [FreeTextEntry1] : Cataract [FreeTextEntry2] : 4/12/22 [FreeTextEntry4] : 57 y/o M with hx of DM type 2 presenting for pre-op clearance prior to cataract surgery

## 2022-03-25 NOTE — ASSESSMENT
[High Risk Surgery - Intraperitoneal, Intrathoracic or Supringuinal Vascular Procedures] : High Risk Surgery - Intraperitoneal, Intrathoracic or Supringuinal Vascular Procedures - No (0) [Ischemic Heart Disease] : Ischemic Heart Disease - No (0) [Congestive Heart Failure] : Congestive Heart Failure - No (0) [Prior Cerebrovascular Accident or TIA] : Prior Cerebrovascular Accident or TIA - No (0) [Insulin-dependent Diabetic (1 point)] : Insulin-dependent Diabetic - Yes (1) [Creatinine >= 2mg/dL (1 Point)] : Creatinine >= 2mg/dL - No (0) [1] : 1 , RCRI Class: II, Risk of Post-Op Cardiac Complications: 6.0%, 95% CI for Risk Estimate: 4.9% - 7.4% [ECG] : ECG

## 2022-03-25 NOTE — PHYSICAL EXAM
[No Acute Distress] : no acute distress [No Respiratory Distress] : no respiratory distress  [No Accessory Muscle Use] : no accessory muscle use [Normal Rate] : normal rate  [Regular Rhythm] : with a regular rhythm [Soft] : abdomen soft [Non Tender] : non-tender [Normal Bowel Sounds] : normal bowel sounds [Alert and Oriented x3] : oriented to person, place, and time

## 2022-03-28 ENCOUNTER — NON-APPOINTMENT (OUTPATIENT)
Age: 57
End: 2022-03-28

## 2022-03-28 DIAGNOSIS — Z01.818 ENCOUNTER FOR OTHER PREPROCEDURAL EXAMINATION: ICD-10-CM

## 2022-03-28 DIAGNOSIS — H26.9 UNSPECIFIED CATARACT: ICD-10-CM

## 2022-03-28 DIAGNOSIS — E11.9 TYPE 2 DIABETES MELLITUS WITHOUT COMPLICATIONS: ICD-10-CM

## 2022-03-28 LAB
CREAT SPEC-SCNC: 65 MG/DL
MICROALBUMIN 24H UR DL<=1MG/L-MCNC: <1.2 MG/DL
MICROALBUMIN/CREAT 24H UR-RTO: NORMAL MG/G

## 2022-04-04 ENCOUNTER — OUTPATIENT (OUTPATIENT)
Dept: OUTPATIENT SERVICES | Facility: HOSPITAL | Age: 57
LOS: 1 days | End: 2022-04-04
Payer: SELF-PAY

## 2022-04-04 ENCOUNTER — APPOINTMENT (OUTPATIENT)
Dept: FAMILY MEDICINE | Facility: HOSPITAL | Age: 57
End: 2022-04-04

## 2022-04-04 ENCOUNTER — RESULT CHARGE (OUTPATIENT)
Age: 57
End: 2022-04-04

## 2022-04-04 VITALS
WEIGHT: 134 LBS | SYSTOLIC BLOOD PRESSURE: 104 MMHG | TEMPERATURE: 97.8 F | BODY MASS INDEX: 23 KG/M2 | OXYGEN SATURATION: 98 % | HEART RATE: 70 BPM | RESPIRATION RATE: 14 BRPM | DIASTOLIC BLOOD PRESSURE: 68 MMHG

## 2022-04-04 DIAGNOSIS — Z00.00 ENCOUNTER FOR GENERAL ADULT MEDICAL EXAMINATION WITHOUT ABNORMAL FINDINGS: ICD-10-CM

## 2022-04-04 PROCEDURE — G0463: CPT

## 2022-04-04 NOTE — HISTORY OF PRESENT ILLNESS
[FreeTextEntry8] : 55 y/o M w/ PMHx of DM, cataracts presenting today for f/u of his diabetes management. Patient is requesting an alternative to the metronidazole cream he was prescribed for his rosacea as he states it is too expensive. He has been taking his insulin (20 units BID) but has not been taking Metformin and Simvastatin because he has not been able to find transportation to [facility]. He also endorses new diet modifications implemented 2 weeks ago and include more lean meats and vegetables with an emphasis on decreased carbs.

## 2022-04-04 NOTE — PHYSICAL EXAM
[No Respiratory Distress] : no respiratory distress  [No Accessory Muscle Use] : no accessory muscle use [Normal Rate] : normal rate  [Regular Rhythm] : with a regular rhythm [Normal S1, S2] : normal S1 and S2 [No Murmur] : no murmur heard [No Abdominal Bruit] : a ~M bruit was not heard ~T in the abdomen [No Varicosities] : no varicosities [Pedal Pulses Present] : the pedal pulses are present [No Extremity Clubbing/Cyanosis] : no extremity clubbing/cyanosis [Normal] : soft, non-tender, non-distended, no masses palpated, no HSM and normal bowel sounds [Coordination Grossly Intact] : coordination grossly intact [No Focal Deficits] : no focal deficits [Normal Gait] : normal gait [Speech Grossly Normal] : speech grossly normal [Normal Affect] : the affect was normal [Normal Mood] : the mood was normal [de-identified] : + erythematous rash on forehead and malar region, including nasolabial folds

## 2022-04-04 NOTE — REVIEW OF SYSTEMS
[Vision Problems] : no vision problems [Chest Pain] : no chest pain [Lower Ext Edema] : no lower extremity edema [Shortness Of Breath] : no shortness of breath [Abdominal Pain] : no abdominal pain [Incontinence] : no incontinence [Hesitancy] : no hesitancy [Nocturia] : no nocturia [Frequency] : no frequency [Skin Rash] : no skin rash [de-identified] : +peripheral neuropathy in bilateral LE (unchanged from prior visits)

## 2022-04-06 DIAGNOSIS — L71.9 ROSACEA, UNSPECIFIED: ICD-10-CM

## 2022-04-06 DIAGNOSIS — K28.9 GASTROJEJUNAL ULCER, UNSPECIFIED AS ACUTE OR CHRONIC, WITHOUT HEMORRHAGE OR PERFORATION: ICD-10-CM

## 2022-04-06 DIAGNOSIS — E11.49 TYPE 2 DIABETES MELLITUS WITH OTHER DIABETIC NEUROLOGICAL COMPLICATION: ICD-10-CM

## 2022-04-12 ENCOUNTER — APPOINTMENT (OUTPATIENT)
Dept: OPHTHALMOLOGY | Facility: AMBULATORY SURGERY CENTER | Age: 57
End: 2022-04-12

## 2022-04-13 ENCOUNTER — APPOINTMENT (OUTPATIENT)
Dept: OPHTHALMOLOGY | Facility: CLINIC | Age: 57
End: 2022-04-13

## 2022-04-18 ENCOUNTER — APPOINTMENT (OUTPATIENT)
Dept: FAMILY MEDICINE | Facility: HOSPITAL | Age: 57
End: 2022-04-18

## 2022-04-20 ENCOUNTER — APPOINTMENT (OUTPATIENT)
Dept: OPHTHALMOLOGY | Facility: CLINIC | Age: 57
End: 2022-04-20

## 2022-05-09 ENCOUNTER — APPOINTMENT (OUTPATIENT)
Dept: FAMILY MEDICINE | Facility: HOSPITAL | Age: 57
End: 2022-05-09

## 2022-05-09 ENCOUNTER — OUTPATIENT (OUTPATIENT)
Dept: OUTPATIENT SERVICES | Facility: HOSPITAL | Age: 57
LOS: 1 days | End: 2022-05-09
Payer: COMMERCIAL

## 2022-05-09 ENCOUNTER — OUTPATIENT (OUTPATIENT)
Dept: OUTPATIENT SERVICES | Facility: HOSPITAL | Age: 57
LOS: 1 days | End: 2022-05-09
Payer: SELF-PAY

## 2022-05-09 VITALS
HEART RATE: 88 BPM | BODY MASS INDEX: 23.52 KG/M2 | WEIGHT: 137 LBS | SYSTOLIC BLOOD PRESSURE: 121 MMHG | TEMPERATURE: 97.3 F | DIASTOLIC BLOOD PRESSURE: 78 MMHG

## 2022-05-09 VITALS — RESPIRATION RATE: 14 BRPM | OXYGEN SATURATION: 98 %

## 2022-05-09 DIAGNOSIS — Z12.11 ENCOUNTER FOR SCREENING FOR MALIGNANT NEOPLASM OF COLON: ICD-10-CM

## 2022-05-09 DIAGNOSIS — Z00.00 ENCOUNTER FOR GENERAL ADULT MEDICAL EXAMINATION WITHOUT ABNORMAL FINDINGS: ICD-10-CM

## 2022-05-09 DIAGNOSIS — Z92.29 PERSONAL HISTORY OF OTHER DRUG THERAPY: ICD-10-CM

## 2022-05-09 PROCEDURE — G0463: CPT

## 2022-05-09 RX ORDER — METFORMIN HYDROCHLORIDE 1000 MG/1
1000 TABLET, COATED ORAL
Qty: 180 | Refills: 2 | Status: DISCONTINUED | COMMUNITY
Start: 2019-01-17 | End: 2022-05-09

## 2022-05-11 NOTE — PLAN
[FreeTextEntry1] : #Diabetes Mellitus\par POCT A1C 6.6\par Insulin  20 units BID  \par Restart Metformin\par Lisinopril renewed\par Education given regarding hyperglycemic and hypoglycemic symptoms\par Patient encouraged to continue diabetic conscious diet\par Patient has optho f/u, needs to schedule cataract surgery \par atorvastatin renewed\par albumin/cr ratio wnl\par Patient has financial insecurity which impedes him from continuing with his medication regimen \par \par #Neuropathy\par Podiatry referral\par Patient had diminished sensation in left foot \par \par RTC in 2 months\par \par Diabetes Clinic \par \par \par Discussed with Dr. Inman \par

## 2022-05-11 NOTE — REVIEW OF SYSTEMS
[Fever] : no fever [Chills] : no chills [Night Sweats] : no night sweats [Discharge] : no discharge [Earache] : no earache [Chest Pain] : no chest pain [Palpitations] : no palpitations [Shortness Of Breath] : no shortness of breath [Wheezing] : no wheezing [Abdominal Pain] : no abdominal pain [Vomiting] : no vomiting

## 2022-05-11 NOTE — PHYSICAL EXAM
[No Acute Distress] : no acute distress [Normal Sclera/Conjunctiva] : normal sclera/conjunctiva [PERRL] : pupils equal round and reactive to light [Normal Outer Ear/Nose] : the outer ears and nose were normal in appearance [Normal Oropharynx] : the oropharynx was normal [Normal TMs] : both tympanic membranes were normal [No Lymphadenopathy] : no lymphadenopathy [No Respiratory Distress] : no respiratory distress  [Normal Rate] : normal rate  [Regular Rhythm] : with a regular rhythm [Soft] : abdomen soft [Non Tender] : non-tender [No HSM] : no HSM [Normal Bowel Sounds] : normal bowel sounds [Normal Anterior Cervical Nodes] : no anterior cervical lymphadenopathy [No Rash] : no rash [Alert and Oriented x3] : oriented to person, place, and time [Right Foot Was Examined] : Right foot ~C was examined [Left Foot Was Examined] : left foot ~C was examined [None] : no ulcers in either foot were found [] : left foot [___] : left foot points [unfilled] [TWNoteComboBox4] : +2

## 2022-05-11 NOTE — HISTORY OF PRESENT ILLNESS
[FreeTextEntry1] : Medication Refill, foot pain [de-identified] : 56 yr male pt with history of DM2, last A1C was   , currently on metformin and insulin. Patient comes in wanting refill for his insulin. . Complains of sharp pain in bilateral feet improves with prescribed medication that he does not recall the name. Pain started three years ago, is episodic in nature. Episodes usually occur at before he goes to sleep and lasts the entire night.  of denies headache, dizziness, nausea, blurry vision, urinary urgency, dysuria. Complains of pain in bilateral soles, improves with prescribed medication that he does not recall the name of. States he checks his sugar everyday and is usually in the 160-170. He has noticed one episode of hypoglycemia with shaking if he does not eat. BUt it goes away with food. He says his eyesight affects his ability to give himself insulin, but he has a neighbor who helps him.

## 2022-05-12 DIAGNOSIS — E11.49 TYPE 2 DIABETES MELLITUS WITH OTHER DIABETIC NEUROLOGICAL COMPLICATION: ICD-10-CM

## 2022-05-12 DIAGNOSIS — L71.9 ROSACEA, UNSPECIFIED: ICD-10-CM

## 2022-05-12 DIAGNOSIS — H26.9 UNSPECIFIED CATARACT: ICD-10-CM

## 2022-05-12 DIAGNOSIS — Z12.11 ENCOUNTER FOR SCREENING FOR MALIGNANT NEOPLASM OF COLON: ICD-10-CM

## 2022-05-16 ENCOUNTER — APPOINTMENT (OUTPATIENT)
Dept: OPHTHALMOLOGY | Facility: CLINIC | Age: 57
End: 2022-05-16

## 2022-05-16 RX ORDER — GABAPENTIN 100 MG/1
100 CAPSULE ORAL
Qty: 30 | Refills: 2 | Status: ACTIVE | COMMUNITY
Start: 2019-10-07 | End: 1900-01-01

## 2022-05-26 ENCOUNTER — OUTPATIENT (OUTPATIENT)
Dept: OUTPATIENT SERVICES | Facility: HOSPITAL | Age: 57
LOS: 1 days | End: 2022-05-26
Payer: SELF-PAY

## 2022-05-26 VITALS
HEART RATE: 60 BPM | TEMPERATURE: 98 F | SYSTOLIC BLOOD PRESSURE: 120 MMHG | HEIGHT: 62.5 IN | WEIGHT: 136.91 LBS | OXYGEN SATURATION: 99 % | RESPIRATION RATE: 15 BRPM | DIASTOLIC BLOOD PRESSURE: 77 MMHG

## 2022-05-26 DIAGNOSIS — H25.811 COMBINED FORMS OF AGE-RELATED CATARACT, RIGHT EYE: ICD-10-CM

## 2022-05-26 DIAGNOSIS — R94.31 ABNORMAL ELECTROCARDIOGRAM [ECG] [EKG]: ICD-10-CM

## 2022-05-26 DIAGNOSIS — Z01.818 ENCOUNTER FOR OTHER PREPROCEDURAL EXAMINATION: ICD-10-CM

## 2022-05-26 DIAGNOSIS — H26.9 UNSPECIFIED CATARACT: ICD-10-CM

## 2022-05-26 LAB
A1C WITH ESTIMATED AVERAGE GLUCOSE RESULT: 9.6 % — HIGH (ref 4–5.6)
ALBUMIN SERPL ELPH-MCNC: 4.1 G/DL — SIGNIFICANT CHANGE UP (ref 3.3–5)
ALP SERPL-CCNC: 79 U/L — SIGNIFICANT CHANGE UP (ref 30–120)
ALT FLD-CCNC: 23 U/L DA — SIGNIFICANT CHANGE UP (ref 10–60)
ANION GAP SERPL CALC-SCNC: 5 MMOL/L — SIGNIFICANT CHANGE UP (ref 5–17)
AST SERPL-CCNC: 18 U/L — SIGNIFICANT CHANGE UP (ref 10–40)
BILIRUB SERPL-MCNC: 0.5 MG/DL — SIGNIFICANT CHANGE UP (ref 0.2–1.2)
BUN SERPL-MCNC: 11 MG/DL — SIGNIFICANT CHANGE UP (ref 7–23)
CALCIUM SERPL-MCNC: 9.5 MG/DL — SIGNIFICANT CHANGE UP (ref 8.4–10.5)
CHLORIDE SERPL-SCNC: 101 MMOL/L — SIGNIFICANT CHANGE UP (ref 96–108)
CO2 SERPL-SCNC: 31 MMOL/L — SIGNIFICANT CHANGE UP (ref 22–31)
CREAT SERPL-MCNC: 0.8 MG/DL — SIGNIFICANT CHANGE UP (ref 0.5–1.3)
EGFR: 103 ML/MIN/1.73M2 — SIGNIFICANT CHANGE UP
ESTIMATED AVERAGE GLUCOSE: 229 MG/DL — HIGH (ref 68–114)
GLUCOSE SERPL-MCNC: 114 MG/DL — HIGH (ref 70–99)
HCT VFR BLD CALC: 41.4 % — SIGNIFICANT CHANGE UP (ref 39–50)
HGB BLD-MCNC: 14.8 G/DL — SIGNIFICANT CHANGE UP (ref 13–17)
MCHC RBC-ENTMCNC: 31.1 PG — SIGNIFICANT CHANGE UP (ref 27–34)
MCHC RBC-ENTMCNC: 35.7 GM/DL — SIGNIFICANT CHANGE UP (ref 32–36)
MCV RBC AUTO: 87 FL — SIGNIFICANT CHANGE UP (ref 80–100)
NRBC # BLD: 0 /100 WBCS — SIGNIFICANT CHANGE UP (ref 0–0)
PLATELET # BLD AUTO: 249 K/UL — SIGNIFICANT CHANGE UP (ref 150–400)
POTASSIUM SERPL-MCNC: 3.9 MMOL/L — SIGNIFICANT CHANGE UP (ref 3.5–5.3)
POTASSIUM SERPL-SCNC: 3.9 MMOL/L — SIGNIFICANT CHANGE UP (ref 3.5–5.3)
PROT SERPL-MCNC: 7.8 G/DL — SIGNIFICANT CHANGE UP (ref 6–8.3)
RBC # BLD: 4.76 M/UL — SIGNIFICANT CHANGE UP (ref 4.2–5.8)
RBC # FLD: 11.9 % — SIGNIFICANT CHANGE UP (ref 10.3–14.5)
SODIUM SERPL-SCNC: 137 MMOL/L — SIGNIFICANT CHANGE UP (ref 135–145)
WBC # BLD: 5.54 K/UL — SIGNIFICANT CHANGE UP (ref 3.8–10.5)
WBC # FLD AUTO: 5.54 K/UL — SIGNIFICANT CHANGE UP (ref 3.8–10.5)

## 2022-05-26 PROCEDURE — 93010 ELECTROCARDIOGRAM REPORT: CPT

## 2022-05-26 PROCEDURE — 80053 COMPREHEN METABOLIC PANEL: CPT

## 2022-05-26 PROCEDURE — 93005 ELECTROCARDIOGRAM TRACING: CPT

## 2022-05-26 PROCEDURE — 83036 HEMOGLOBIN GLYCOSYLATED A1C: CPT

## 2022-05-26 PROCEDURE — G0463: CPT

## 2022-05-26 PROCEDURE — 36415 COLL VENOUS BLD VENIPUNCTURE: CPT

## 2022-05-26 PROCEDURE — 85027 COMPLETE CBC AUTOMATED: CPT

## 2022-05-26 RX ORDER — METFORMIN HYDROCHLORIDE 850 MG/1
1 TABLET ORAL
Qty: 0 | Refills: 0 | DISCHARGE

## 2022-05-26 RX ORDER — FLUOXETINE HCL 10 MG
1 CAPSULE ORAL
Qty: 0 | Refills: 0 | DISCHARGE

## 2022-05-26 RX ORDER — IBUPROFEN 200 MG
2 TABLET ORAL
Qty: 0 | Refills: 0 | DISCHARGE

## 2022-05-26 RX ORDER — LISINOPRIL 2.5 MG/1
1 TABLET ORAL
Qty: 0 | Refills: 0 | DISCHARGE

## 2022-05-26 RX ORDER — INSULIN NPH HUM/REG INSULIN HM 70-30/ML
20 VIAL (ML) SUBCUTANEOUS
Qty: 0 | Refills: 0 | DISCHARGE

## 2022-05-26 RX ORDER — SIMVASTATIN 20 MG/1
1 TABLET, FILM COATED ORAL
Qty: 0 | Refills: 0 | DISCHARGE

## 2022-05-26 NOTE — H&P PST ADULT - NSANTHOSAYNRD_GEN_A_CORE
No. RONNA screening performed.  STOP BANG Legend: 0-2 = LOW Risk; 3-4 = INTERMEDIATE Risk; 5-8 = HIGH Risk

## 2022-05-26 NOTE — H&P PST ADULT - PULMONARY EMBOLUS
Chief Complaint  weight check      Active Problems    1  No active medical problems    Current Meds   1  No Reported Medications Recorded    Allergies    1  No Known Drug Allergies    Vitals  Signs [Data Includes: Current Encounter]    Weight: 3 82 kg  0-24 Weight Percentile: 61 %    Discussion/Summary    Bottle fed 3-4oz every 3-4 hours  no spitting up  wets 6-8/day and has 3-5 bms/day  adequate weight gain  see back for 1 mo well        Signatures   Electronically signed by : Cleo Murrieta, ; Apr 29 2016  1:45PM EST                       (Author)    Electronically signed by : SMITHA Rodriguez MD; Apr 29 2016  4:34PM EST                       (Author)
no

## 2022-05-26 NOTE — H&P PST ADULT - NSICDXPASTMEDICALHX_GEN_ALL_CORE_FT
PAST MEDICAL HISTORY:  Bilateral cataracts     Diabetes type 2    High cholesterol     Low back pain resolved    Neuropathic pain feet

## 2022-05-26 NOTE — H&P PST ADULT - PROBLEM SELECTOR PLAN 1
cataract extraction with IOL right eye, preop instructions given, has covid appt 6/13 at [facility]; to go for medical clearance

## 2022-06-02 ENCOUNTER — NON-APPOINTMENT (OUTPATIENT)
Age: 57
End: 2022-06-02

## 2022-06-03 ENCOUNTER — APPOINTMENT (OUTPATIENT)
Dept: FAMILY MEDICINE | Facility: HOSPITAL | Age: 57
End: 2022-06-03

## 2022-06-03 DIAGNOSIS — Z55.0 ILLITERACY AND LOW-LEVEL LITERACY: ICD-10-CM

## 2022-06-03 PROBLEM — H26.9 UNSPECIFIED CATARACT: Chronic | Status: ACTIVE | Noted: 2022-05-26

## 2022-06-03 PROBLEM — M79.2 NEURALGIA AND NEURITIS, UNSPECIFIED: Chronic | Status: ACTIVE | Noted: 2019-09-16

## 2022-06-03 PROBLEM — M54.5 LOW BACK PAIN: Chronic | Status: ACTIVE | Noted: 2019-09-16

## 2022-06-03 PROBLEM — E11.9 TYPE 2 DIABETES MELLITUS WITHOUT COMPLICATIONS: Chronic | Status: ACTIVE | Noted: 2019-01-18

## 2022-06-03 RX ORDER — METFORMIN HYDROCHLORIDE 1000 MG/1
1000 TABLET, EXTENDED RELEASE ORAL DAILY
Qty: 90 | Refills: 3 | Status: COMPLETED | COMMUNITY
Start: 2022-05-09 | End: 2022-06-03

## 2022-06-03 SDOH — EDUCATIONAL SECURITY - EDUCATION ATTAINMENT: ILITERACY AND LOW LEVEL LITERACY: Z55.0

## 2022-06-03 NOTE — HISTORY OF PRESENT ILLNESS
[FreeTextEntry1] : HERE TODAY FOR DIABETES FOLLOW UP\par FEELS WELL\par DENIES POLYURIA, POLYDIPSIA OR UNINTENTIONAL WEIGHT LOSS \par A1C 13.1% 4/4/22 UP FROM 6.8% AUGUST 2021\par PT STATES HE WAS OUT OF WORK FOR SEVERAL MONTHS AND WAS WITHOUT MEDICATION\par .REPORTS COMPLIANCE W/ METFORMIN 1,000 MG BEFORE BREAKFAST AND DINNER \par REPORTS COMPLIANCE W/ NOVOLIN 70/30  20 UNITS BEFORE BREAKFAST AND DINNER \par REPORTS NEUROPATHY MUCH IMPROVED SINCE RESTARTING GABAPENTIN (DID NOT BRING WITH HIM)\par VERY UNCLEAR HOW REFILLS WORK\par DOES NOT TEST BG - UNWILLING TO PRICK HIS FINGERS\par DENIES SIGNS AND SYMPTOMS OF HYPOGLYCEMIA \par EATING REASONABLE DIET.  NO SODA OR JUICE\par SCHEDULED FOR CATARACT SURGERY ON 6/15/22.  CLEARANCE ON 6/13\par UTD W/ OPTHO\par \par \par

## 2022-06-03 NOTE — ASSESSMENT
[FreeTextEntry1] : PT WAS EDUCATED ON SIGNIFICANCE OF A1C OF 13.1% ON LONG AND SHORT TERM COMPLICATIONS \par DISCUSSED GENERAL A1C GOAL OF 7% AND BG GOALS  BEFORE MEALS AND < 180 2 HOURS AFTER MEALS TO HELP REDUCE INCIDENCE OF COMPLICATIONS \par EDUCATED ON HOW REFILLS WORK\par PT REQUESTED FLOR 2 CGM SAMPLE.  PT HAD READER FROM [employer]\par SAMPLE FLOR 2 CGM PLACED ON LEFT UPPER ARM\par INSTRUCTED TO AVOID MORE THAN 500 MG OF VITAMIN C (PT DOES NOT TAKE)\par EDUCATED ON NEED TO SCAN AT LEAST QH 8 TO AVOID GAPS IN DATA\par INSTRUCTED TO REMOVE PRIOR TO ANY X-RAY, MRI OR CT SCAN\par EXPLAINED THAT CGM IS TESTING INTERSTITIAL FLUID, RATHER THAN THE CAPILLARY BLOOD MEASURED WHEN PT PERFORMS A FINGERSTICK\par INSTRUCTED TO VERIFY ANY RESULTS THAT SEEM INACCURATE OR IF HAVING FEELINGS OF LOW BG BY PERFORMING FINGERSTICK\par EDUCATED ON SIGNS AND SYMPTOMS OF SITE INFECTION AND IMPORTANCE OF REMOVING SENSOR AND CALLING OFFICE ASAP \par INSTRUCTED TO REMOVE SENSOR AFTER 14 DAYS.  SENSOR MAY BE DISCARDED.  STRESSED IMPORTANCE OF BRINGING READER TO NEXT APPT. ON 6/13/22 FOR DOWNLOAD\par .INSTRUCTED TO CALL OFFICE FOR BG < 70, > 250 OR FOR ANY QUESTIONS OR CONCERNS\par   [Diabetes Foot Care] : diabetes foot care [Long Term Vascular Complications] : long term vascular complications of diabetes [Carbohydrate Consistent Diet] : carbohydrate consistent diet [Exercise/Effect on Glucose] : exercise/effect on glucose [Hypoglycemia Management] : hypoglycemia management [Action and use of Insulin] : action and use of short and long-acting insulin [Self Monitoring of Blood Glucose] : self monitoring of blood glucose [Injection Technique, Storage, Sharps Disposal] : injection technique, storage, and sharps disposal

## 2022-06-03 NOTE — REVIEW OF SYSTEMS
[Fatigue] : no fatigue [Recent Weight Gain (___ Lbs)] : no recent weight gain [Recent Weight Loss (___ Lbs)] : no recent weight loss [Eye Pain] : no pain [Blurred Vision] : no blurred vision [Chest Pain] : no chest pain [Nausea] : no nausea [Diarrhea] : no diarrhea [Gas/Bloating] : no gas/bloating [Polyuria] : no polyuria [Joint Pain] : no joint pain [Polydipsia] : no polydipsia

## 2022-06-03 NOTE — REASON FOR VISIT
[Follow - Up] : a follow-up visit [DM Type 2] : DM Type 2 [[employer] ] : provided by [employer]   [Time Spent: ____ minutes] : Total time spent using  services: [unfilled] minutes. The patient's primary language is not English thus required  services. [Interpreters_IDNumber] : [id number] [Interpreters_FullName] : FREEMAN [FreeTextEntry2] : DR. HUDDLESTON [TWNoteComboBox1] : Samoan

## 2022-06-03 NOTE — PHYSICAL EXAM
[Alert] : alert [No Acute Distress] : no acute distress [No Respiratory Distress] : no respiratory distress [Oriented x3] : oriented to person, place, and time [Normal Affect] : the affect was normal [Recent Memory Normal] : recent memory was not impaired [Normal Insight/Judgement] : insight and judgment were intact [Normal Mood] : the mood was normal [Remote Memory Normal] : remote memory was not impaired

## 2022-06-13 ENCOUNTER — APPOINTMENT (OUTPATIENT)
Dept: FAMILY MEDICINE | Facility: HOSPITAL | Age: 57
End: 2022-06-13

## 2022-06-13 ENCOUNTER — OUTPATIENT (OUTPATIENT)
Dept: OUTPATIENT SERVICES | Facility: HOSPITAL | Age: 57
LOS: 1 days | End: 2022-06-13
Payer: SELF-PAY

## 2022-06-13 ENCOUNTER — RESULT CHARGE (OUTPATIENT)
Age: 57
End: 2022-06-13

## 2022-06-13 VITALS
BODY MASS INDEX: 24.2 KG/M2 | HEART RATE: 68 BPM | WEIGHT: 141 LBS | DIASTOLIC BLOOD PRESSURE: 71 MMHG | RESPIRATION RATE: 16 BRPM | SYSTOLIC BLOOD PRESSURE: 105 MMHG | OXYGEN SATURATION: 97 % | TEMPERATURE: 98.4 F

## 2022-06-13 DIAGNOSIS — H26.9 UNSPECIFIED CATARACT: ICD-10-CM

## 2022-06-13 DIAGNOSIS — Z00.00 ENCOUNTER FOR GENERAL ADULT MEDICAL EXAMINATION WITHOUT ABNORMAL FINDINGS: ICD-10-CM

## 2022-06-13 LAB
HBA1C MFR BLD HPLC: 8.2
POC PERFORMING LOCATION: NORMAL
POCCOVIDPCR: NEGATIVE
SARS-COV-2 RNA CT RESP QN NAA+PROBE: NEGATIVE

## 2022-06-13 PROCEDURE — G0463: CPT

## 2022-06-14 ENCOUNTER — TRANSCRIPTION ENCOUNTER (OUTPATIENT)
Age: 57
End: 2022-06-14

## 2022-06-14 NOTE — HISTORY OF PRESENT ILLNESS
[Diabetes] : diabetes [(Patient denies any chest pain, claudication, dyspnea on exertion, orthopnea, palpitations or syncope)] : Patient denies any chest pain, claudication, dyspnea on exertion, orthopnea, palpitations or syncope [Moderate (4-6 METs)] : Moderate (4-6 METs) [FreeTextEntry1] : Right Eye Cataract Surgery [FreeTextEntry2] : 6/15/2022 [FreeTextEntry4] : 57 yo M with PMH of DM2 and cataracts presents for preoperative assessment prior to cataract surgery. He will have the surgery done on the right eye then the left eye. He denies dizziness, lightheadedness, CP, palpitations, SOB, abdominal pain, N/V/D. He is able to climb a flights of stairs without dyspnea on exertion or chest pain.  [FreeTextEntry7] : ECG 5/26/22 sinus bradycardia with non specific T wave inversions unchanged from previous ecg in March 2022

## 2022-06-14 NOTE — ADDENDUM
[FreeTextEntry1] : 57 yo M with PMH of DM2 here for preoperative assessment prior to cataract surgery.\par Patient's presurgical testing from 5/26/22 was reviewed: EKG showed sinus bradycardia and nonspecific T wave abnormality with T wave inversions in V1-V2 which were present on previous EKG in March. CBC unremarkable, BUN/Cr 11/0.8, A1c 9.6%. \par Patient reported compliance with DM regimen to resident. POCT A1c today 6/13/22 was 8.2%. \par \par Patient is medically optimized as intermediate risk for low risk cataract surgery, with RCRI Class II given preoperative insulin use.

## 2022-06-14 NOTE — ASSESSMENT
[Patient Optimized for Surgery] : Patient optimized for surgery [No Further Testing Recommended] : no further testing recommended [As per surgery] : as per surgery [FreeTextEntry4] : \par Patient is a 57 y/o male with medical history of DM2 and cataracts presenting for preoperative clearance for right sided eye cataract surgery scheduled for 6/15/22.\par A1c in office 8.3\par Patient is intermediate risk for low risk surgery, RCRI class II

## 2022-06-14 NOTE — REVIEW OF SYSTEMS
[Fever] : no fever [Chills] : no chills [Fatigue] : no fatigue [Chest Pain] : no chest pain [Palpitations] : no palpitations [Shortness Of Breath] : no shortness of breath [Dyspnea on Exertion] : not dyspnea on exertion [Abdominal Pain] : no abdominal pain [Nausea] : no nausea [Vomiting] : no vomiting [Headache] : no headache [Dizziness] : no dizziness

## 2022-06-15 ENCOUNTER — NON-APPOINTMENT (OUTPATIENT)
Age: 57
End: 2022-06-15

## 2022-06-15 ENCOUNTER — TRANSCRIPTION ENCOUNTER (OUTPATIENT)
Age: 57
End: 2022-06-15

## 2022-06-15 ENCOUNTER — APPOINTMENT (OUTPATIENT)
Dept: OPHTHALMOLOGY | Facility: HOSPITAL | Age: 57
End: 2022-06-15
Payer: MEDICAID

## 2022-06-15 ENCOUNTER — OUTPATIENT (OUTPATIENT)
Dept: OUTPATIENT SERVICES | Facility: HOSPITAL | Age: 57
LOS: 1 days | End: 2022-06-15
Payer: SELF-PAY

## 2022-06-15 VITALS
OXYGEN SATURATION: 99 % | HEART RATE: 62 BPM | RESPIRATION RATE: 12 BRPM | DIASTOLIC BLOOD PRESSURE: 71 MMHG | SYSTOLIC BLOOD PRESSURE: 115 MMHG

## 2022-06-15 VITALS
WEIGHT: 143.74 LBS | HEIGHT: 63 IN | TEMPERATURE: 98 F | SYSTOLIC BLOOD PRESSURE: 115 MMHG | OXYGEN SATURATION: 98 % | HEART RATE: 58 BPM | DIASTOLIC BLOOD PRESSURE: 72 MMHG | RESPIRATION RATE: 17 BRPM

## 2022-06-15 DIAGNOSIS — H25.811 COMBINED FORMS OF AGE-RELATED CATARACT, RIGHT EYE: ICD-10-CM

## 2022-06-15 DIAGNOSIS — Z01.818 ENCOUNTER FOR OTHER PREPROCEDURAL EXAMINATION: ICD-10-CM

## 2022-06-15 LAB — GLUCOSE BLDC GLUCOMTR-MCNC: 97 MG/DL — SIGNIFICANT CHANGE UP (ref 70–99)

## 2022-06-15 PROCEDURE — 66984 XCAPSL CTRC RMVL W/O ECP: CPT | Mod: RT

## 2022-06-15 PROCEDURE — 82962 GLUCOSE BLOOD TEST: CPT

## 2022-06-15 PROCEDURE — V2632: CPT

## 2022-06-15 DEVICE — LENS IOL ACRYSOF SN60WF 22.5D
Type: IMPLANTABLE DEVICE | Site: RIGHT | Status: NON-FUNCTIONAL
Removed: 2022-06-15

## 2022-06-15 NOTE — ASU DISCHARGE PLAN (ADULT/PEDIATRIC) - CARE PROVIDER_API CALL
Christy Knowles (MD; MPH)  Ophthalmology  [address], [address] [place] [address]  Phone: [phone]  Fax: [phone]  Scheduled Appointment: 06/16/2022

## 2022-06-15 NOTE — ASU DISCHARGE PLAN (ADULT/PEDIATRIC) - NS MD DC FALL RISK RISK
For information on Fall & Injury Prevention, visit: [email or web address] OR  [email or web address] OR  https://www.cdc.gov/steadi/patient.html

## 2022-06-15 NOTE — ASU DISCHARGE PLAN (ADULT/PEDIATRIC) - PATIENT EDUCATION MATERIALS PROVIED
Eye shield with instructions , sunglasses and eye kit given to patient. Intraocular lens implant (IOL)/Other (specify)

## 2022-06-15 NOTE — ASU DISCHARGE PLAN (ADULT/PEDIATRIC) - ACTIVITY LEVEL
No excercise/No heavy lifting/No sports/gym/No weight bearing/Quiet play/Weight bearing as tolerated/Elevate extremity/Nothing per rectum/Nothing per vagina/No tub baths/No douching/No tampons/No intercourse

## 2022-06-16 ENCOUNTER — NON-APPOINTMENT (OUTPATIENT)
Age: 57
End: 2022-06-16

## 2022-06-16 ENCOUNTER — APPOINTMENT (OUTPATIENT)
Dept: OPHTHALMOLOGY | Facility: CLINIC | Age: 57
End: 2022-06-16

## 2022-06-16 PROCEDURE — 99024 POSTOP FOLLOW-UP VISIT: CPT

## 2022-06-23 ENCOUNTER — APPOINTMENT (OUTPATIENT)
Dept: OPHTHALMOLOGY | Facility: CLINIC | Age: 57
End: 2022-06-23
Payer: MEDICAID

## 2022-06-23 ENCOUNTER — NON-APPOINTMENT (OUTPATIENT)
Age: 57
End: 2022-06-23

## 2022-06-23 PROCEDURE — 99024 POSTOP FOLLOW-UP VISIT: CPT

## 2022-07-14 ENCOUNTER — NON-APPOINTMENT (OUTPATIENT)
Age: 57
End: 2022-07-14

## 2022-07-14 ENCOUNTER — APPOINTMENT (OUTPATIENT)
Dept: OPHTHALMOLOGY | Facility: CLINIC | Age: 57
End: 2022-07-14

## 2022-07-14 PROCEDURE — 92250 FUNDUS PHOTOGRAPHY W/I&R: CPT

## 2022-07-14 PROCEDURE — 99024 POSTOP FOLLOW-UP VISIT: CPT

## 2022-07-25 ENCOUNTER — NON-APPOINTMENT (OUTPATIENT)
Age: 57
End: 2022-07-25

## 2022-07-25 ENCOUNTER — APPOINTMENT (OUTPATIENT)
Dept: OPHTHALMOLOGY | Facility: CLINIC | Age: 57
End: 2022-07-25

## 2022-07-25 PROCEDURE — 92250 FUNDUS PHOTOGRAPHY W/I&R: CPT

## 2022-07-25 PROCEDURE — 92012 INTRM OPH EXAM EST PATIENT: CPT | Mod: 24

## 2022-09-13 ENCOUNTER — NON-APPOINTMENT (OUTPATIENT)
Age: 57
End: 2022-09-13

## 2022-09-21 ENCOUNTER — APPOINTMENT (OUTPATIENT)
Dept: FAMILY MEDICINE | Facility: HOSPITAL | Age: 57
End: 2022-09-21

## 2022-09-21 ENCOUNTER — MED ADMIN CHARGE (OUTPATIENT)
Age: 57
End: 2022-09-21

## 2022-09-21 ENCOUNTER — RESULT CHARGE (OUTPATIENT)
Age: 57
End: 2022-09-21

## 2022-09-21 ENCOUNTER — OUTPATIENT (OUTPATIENT)
Dept: OUTPATIENT SERVICES | Facility: HOSPITAL | Age: 57
LOS: 1 days | End: 2022-09-21
Payer: SELF-PAY

## 2022-09-21 VITALS
TEMPERATURE: 98 F | DIASTOLIC BLOOD PRESSURE: 75 MMHG | SYSTOLIC BLOOD PRESSURE: 106 MMHG | WEIGHT: 146 LBS | HEART RATE: 76 BPM | RESPIRATION RATE: 14 BRPM | OXYGEN SATURATION: 98 % | BODY MASS INDEX: 25.06 KG/M2

## 2022-09-21 DIAGNOSIS — Z23 ENCOUNTER FOR IMMUNIZATION: ICD-10-CM

## 2022-09-21 DIAGNOSIS — L71.9 ROSACEA, UNSPECIFIED: ICD-10-CM

## 2022-09-21 DIAGNOSIS — Z00.00 ENCOUNTER FOR GENERAL ADULT MEDICAL EXAMINATION WITHOUT ABNORMAL FINDINGS: ICD-10-CM

## 2022-09-21 DIAGNOSIS — Z01.818 ENCOUNTER FOR OTHER PREPROCEDURAL EXAMINATION: ICD-10-CM

## 2022-09-21 DIAGNOSIS — E11.65 TYPE 2 DIABETES MELLITUS WITH HYPERGLYCEMIA: ICD-10-CM

## 2022-09-21 LAB — HBA1C MFR BLD HPLC: 6.5

## 2022-09-21 PROCEDURE — 80061 LIPID PANEL: CPT

## 2022-09-21 PROCEDURE — G0463: CPT

## 2022-09-22 LAB
CHOLEST SERPL-MCNC: 180 MG/DL
HDLC SERPL-MCNC: 43 MG/DL
LDLC SERPL CALC-MCNC: 96 MG/DL
NONHDLC SERPL-MCNC: 138 MG/DL
TRIGL SERPL-MCNC: 209 MG/DL

## 2022-09-23 ENCOUNTER — OUTPATIENT (OUTPATIENT)
Dept: OUTPATIENT SERVICES | Facility: HOSPITAL | Age: 57
LOS: 1 days | End: 2022-09-23
Payer: SELF-PAY

## 2022-09-23 ENCOUNTER — APPOINTMENT (OUTPATIENT)
Dept: FAMILY MEDICINE | Facility: HOSPITAL | Age: 57
End: 2022-09-23

## 2022-09-23 ENCOUNTER — RESULT CHARGE (OUTPATIENT)
Age: 57
End: 2022-09-23

## 2022-09-23 VITALS
HEART RATE: 86 BPM | OXYGEN SATURATION: 99 % | RESPIRATION RATE: 14 BRPM | SYSTOLIC BLOOD PRESSURE: 120 MMHG | WEIGHT: 146 LBS | DIASTOLIC BLOOD PRESSURE: 80 MMHG | BODY MASS INDEX: 24.92 KG/M2 | HEIGHT: 64 IN | TEMPERATURE: 98.6 F

## 2022-09-23 DIAGNOSIS — Z00.00 ENCOUNTER FOR GENERAL ADULT MEDICAL EXAMINATION WITHOUT ABNORMAL FINDINGS: ICD-10-CM

## 2022-09-23 LAB
POC PERFORMING LOCATION: NORMAL
POCCOVIDPCR: NEGATIVE
SARS-COV-2 RNA CT RESP QN NAA+PROBE: NEGATIVE

## 2022-09-23 PROCEDURE — G0463: CPT

## 2022-09-24 DIAGNOSIS — Z20.822 CONTACT WITH AND (SUSPECTED) EXPOSURE TO COVID-19: ICD-10-CM

## 2022-09-25 NOTE — HISTORY OF PRESENT ILLNESS
[FreeTextEntry1] : 58 y/o M presents for COVID-19 swab for procedure [de-identified] : 58 y/o M PMH presents for COVID-19 swab for procedure. PT last seen 9/21 for pre-op clearance for L eye cataract surgery. Pt is having procedure on 9/27. Pt feels well today. Denies fever, chills, chest pain, SOB, N/V, abdominal pain, headache, cough, palpitations, leg pain, dizziness, weakness

## 2022-09-26 ENCOUNTER — NON-APPOINTMENT (OUTPATIENT)
Age: 57
End: 2022-09-26

## 2022-09-27 ENCOUNTER — NON-APPOINTMENT (OUTPATIENT)
Age: 57
End: 2022-09-27

## 2022-09-27 ENCOUNTER — APPOINTMENT (OUTPATIENT)
Dept: OPHTHALMOLOGY | Facility: AMBULATORY SURGERY CENTER | Age: 57
End: 2022-09-27

## 2022-09-27 PROCEDURE — 66984 XCAPSL CTRC RMVL W/O ECP: CPT | Mod: LT

## 2022-09-28 ENCOUNTER — NON-APPOINTMENT (OUTPATIENT)
Age: 57
End: 2022-09-28

## 2022-09-28 ENCOUNTER — APPOINTMENT (OUTPATIENT)
Dept: OPHTHALMOLOGY | Facility: CLINIC | Age: 57
End: 2022-09-28

## 2022-09-28 PROCEDURE — 99024 POSTOP FOLLOW-UP VISIT: CPT

## 2022-10-01 NOTE — ASSESSMENT
[Patient Optimized for Surgery] : Patient optimized for surgery [FreeTextEntry4] : Patient is medically optimized for intermediate risk for low risk surgery, RCRI class II

## 2022-10-01 NOTE — INTERPRETER SERVICES
[Patient Declined  Services] : - None: Patient declined  services [FreeTextEntry3] : shared language  [TWNoteComboBox1] : Kosovan

## 2022-10-01 NOTE — HISTORY OF PRESENT ILLNESS
[No Pertinent Cardiac History] : no history of aortic stenosis, atrial fibrillation, coronary artery disease, recent myocardial infarction, or implantable device/pacemaker [No Adverse Anesthesia Reaction] : no adverse anesthesia reaction in self or family member [No Pertinent Pulmonary History] : no history of asthma, COPD, sleep apnea, or smoking [Good (7-10 METs)] : Good (7-10 METs) [FreeTextEntry1] : L eye cataract surgery [FreeTextEntry2] : 9/27/22 [FreeTextEntry4] : 57 yo M with PMH of DM2 and cataracts here for pre-op clearnace for LEFT eye cataract surgery. Patient was last seen 6/13/22 for clearance for RIGHT eye cataract surgery which is scheduled for 9/27/22. He denies dizziness, lightheadedness, CP, palpitations, SOB, abdominal pain, N/V/D. He is able to climb a flights of stairs without dyspnea on exertion or chest pain. \par \par  [FreeTextEntry7] : ECG 5/26/22 sinus bradycardia with non specific T wave inversions unchanged from previous ecg in March 2022.

## 2022-10-01 NOTE — PHYSICAL EXAM
[Normal] : normal rate, regular rhythm, normal S1 and S2 and no murmur heard [Normal Gait] : normal gait [Normal Affect] : the affect was normal [de-identified] : Maculopapular facial rash across forehead and cheeks, no pustules or drainage

## 2022-10-06 ENCOUNTER — APPOINTMENT (OUTPATIENT)
Dept: OPHTHALMOLOGY | Facility: CLINIC | Age: 57
End: 2022-10-06

## 2022-10-06 ENCOUNTER — NON-APPOINTMENT (OUTPATIENT)
Age: 57
End: 2022-10-06

## 2022-10-06 PROCEDURE — 99024 POSTOP FOLLOW-UP VISIT: CPT

## 2022-10-12 ENCOUNTER — APPOINTMENT (OUTPATIENT)
Dept: FAMILY MEDICINE | Facility: HOSPITAL | Age: 57
End: 2022-10-12

## 2022-10-12 ENCOUNTER — OUTPATIENT (OUTPATIENT)
Dept: OUTPATIENT SERVICES | Facility: HOSPITAL | Age: 57
LOS: 1 days | End: 2022-10-12
Payer: SELF-PAY

## 2022-10-12 VITALS
SYSTOLIC BLOOD PRESSURE: 113 MMHG | WEIGHT: 145 LBS | OXYGEN SATURATION: 97 % | TEMPERATURE: 98.1 F | RESPIRATION RATE: 16 BRPM | HEART RATE: 87 BPM | DIASTOLIC BLOOD PRESSURE: 72 MMHG | BODY MASS INDEX: 24.89 KG/M2

## 2022-10-12 DIAGNOSIS — Z00.00 ENCOUNTER FOR GENERAL ADULT MEDICAL EXAMINATION WITHOUT ABNORMAL FINDINGS: ICD-10-CM

## 2022-10-12 DIAGNOSIS — N50.819 TESTICULAR PAIN, UNSPECIFIED: ICD-10-CM

## 2022-10-12 PROCEDURE — G0463: CPT

## 2022-10-13 ENCOUNTER — OUTPATIENT (OUTPATIENT)
Dept: OUTPATIENT SERVICES | Facility: HOSPITAL | Age: 57
LOS: 1 days | End: 2022-10-13
Payer: SELF-PAY

## 2022-10-13 ENCOUNTER — RESULT REVIEW (OUTPATIENT)
Age: 57
End: 2022-10-13

## 2022-10-13 DIAGNOSIS — N50.819 TESTICULAR PAIN, UNSPECIFIED: ICD-10-CM

## 2022-10-13 PROCEDURE — 76870 US EXAM SCROTUM: CPT

## 2022-10-13 PROCEDURE — 76870 US EXAM SCROTUM: CPT | Mod: 26

## 2022-10-19 ENCOUNTER — OUTPATIENT (OUTPATIENT)
Dept: OUTPATIENT SERVICES | Facility: HOSPITAL | Age: 57
LOS: 1 days | End: 2022-10-19
Payer: SELF-PAY

## 2022-10-19 ENCOUNTER — APPOINTMENT (OUTPATIENT)
Dept: FAMILY MEDICINE | Facility: HOSPITAL | Age: 57
End: 2022-10-19

## 2022-10-19 VITALS
BODY MASS INDEX: 24.92 KG/M2 | WEIGHT: 146 LBS | HEIGHT: 64 IN | TEMPERATURE: 98 F | OXYGEN SATURATION: 98 % | HEART RATE: 86 BPM | RESPIRATION RATE: 16 BRPM | SYSTOLIC BLOOD PRESSURE: 135 MMHG | DIASTOLIC BLOOD PRESSURE: 78 MMHG

## 2022-10-19 DIAGNOSIS — Z00.00 ENCOUNTER FOR GENERAL ADULT MEDICAL EXAMINATION WITHOUT ABNORMAL FINDINGS: ICD-10-CM

## 2022-10-19 DIAGNOSIS — N50.819 TESTICULAR PAIN, UNSPECIFIED: ICD-10-CM

## 2022-10-19 PROCEDURE — G0463: CPT

## 2022-10-19 NOTE — PHYSICAL EXAM
[Urethral Meatus] : meatus normal [Urinary Bladder Findings] : the bladder was normal on palpation [Normal] : affect was normal and insight and judgment were intact [FreeTextEntry1] : swelling and tenderness noted in the left testis. Illumination test is positive in the left testis

## 2022-10-19 NOTE — INTERPRETER SERVICES
[[employer] ] : provided by [employer]   [Interpreters_IDNumber] : [id number] [Interpreters_FullName] : Alcides

## 2022-10-19 NOTE — INTERPRETER SERVICES
[[employer] ] : provided by [employer]   [Interpreters_IDNumber] : [id number] [Interpreters_FullName] : Alcides

## 2022-10-19 NOTE — ASSESSMENT
[FreeTextEntry1] : Will follow-up in one week time with testicular us\par A/P was discussed with Dr. Cannon

## 2022-10-19 NOTE — HISTORY OF PRESENT ILLNESS
[FreeTextEntry8] : CC testicular pain \par 57 years old male presents to the clinic with one week history of right testicular pain. According to the pt pain is located in the left testicle and it is like squeezing pain. No fever, swelling, urethral  discharge and recent history of trauma was reported. Pt is not taking any medication for the pain.

## 2022-10-21 ENCOUNTER — OUTPATIENT (OUTPATIENT)
Dept: OUTPATIENT SERVICES | Facility: HOSPITAL | Age: 57
LOS: 1 days | End: 2022-10-21

## 2022-10-21 DIAGNOSIS — N50.819 TESTICULAR PAIN, UNSPECIFIED: ICD-10-CM

## 2022-10-27 ENCOUNTER — OUTPATIENT (OUTPATIENT)
Dept: OUTPATIENT SERVICES | Facility: HOSPITAL | Age: 57
LOS: 1 days | End: 2022-10-27
Payer: SELF-PAY

## 2022-10-27 ENCOUNTER — APPOINTMENT (OUTPATIENT)
Dept: FAMILY MEDICINE | Facility: HOSPITAL | Age: 57
End: 2022-10-27

## 2022-10-27 VITALS
OXYGEN SATURATION: 96 % | RESPIRATION RATE: 16 BRPM | BODY MASS INDEX: 24.72 KG/M2 | DIASTOLIC BLOOD PRESSURE: 79 MMHG | SYSTOLIC BLOOD PRESSURE: 118 MMHG | WEIGHT: 144 LBS | HEART RATE: 77 BPM | TEMPERATURE: 97.7 F

## 2022-10-27 DIAGNOSIS — Z00.00 ENCOUNTER FOR GENERAL ADULT MEDICAL EXAMINATION WITHOUT ABNORMAL FINDINGS: ICD-10-CM

## 2022-10-27 PROCEDURE — G0463: CPT

## 2022-10-27 NOTE — PHYSICAL EXAM
[Normal] : soft, non-tender, non-distended, no masses palpated, no HSM and normal bowel sounds [de-identified] : Will defer examination to next visit

## 2022-10-27 NOTE — ASSESSMENT
[FreeTextEntry1] : 57 years old male presents to the clinic for follow-up of left testicular pain \par -Pt is feeling better\par -US results are pending\par Will follow-up in one week time \par A/P was discussed with  \par

## 2022-10-27 NOTE — HISTORY OF PRESENT ILLNESS
[FreeTextEntry1] : Testicular pain  [de-identified] : 57 years old male presents to the clinic for follow-up of left testicular pain .Pt claims that pain has improved since his last visit. Pt is scheduled for testicular US this week. Denied fever, chills, dysuria \par \par  According to the pt pain is located in the left testicle and it is like squeezing pain. No fever, swelling, urethral discharge and recent history of trauma was reported. Pt is not taking any medication for the pain.

## 2022-10-28 NOTE — HISTORY OF PRESENT ILLNESS
[FreeTextEntry1] : FU L testicular pain  [de-identified] : 58 yo M presents for FU L testicular pain. Reports pain is better today, has responded well to NSAIDs. Denies difficulty in urination.

## 2022-10-29 DIAGNOSIS — N50.3 CYST OF EPIDIDYMIS: ICD-10-CM

## 2022-11-03 ENCOUNTER — APPOINTMENT (OUTPATIENT)
Dept: FAMILY MEDICINE | Facility: HOSPITAL | Age: 57
End: 2022-11-03

## 2022-11-03 ENCOUNTER — NON-APPOINTMENT (OUTPATIENT)
Age: 57
End: 2022-11-03

## 2022-11-03 ENCOUNTER — APPOINTMENT (OUTPATIENT)
Dept: OPHTHALMOLOGY | Facility: CLINIC | Age: 57
End: 2022-11-03

## 2022-11-03 ENCOUNTER — OUTPATIENT (OUTPATIENT)
Dept: OUTPATIENT SERVICES | Facility: HOSPITAL | Age: 57
LOS: 1 days | End: 2022-11-03
Payer: SELF-PAY

## 2022-11-03 VITALS
BODY MASS INDEX: 25.4 KG/M2 | OXYGEN SATURATION: 97 % | WEIGHT: 148 LBS | DIASTOLIC BLOOD PRESSURE: 75 MMHG | TEMPERATURE: 98.3 F | SYSTOLIC BLOOD PRESSURE: 116 MMHG | HEART RATE: 71 BPM | RESPIRATION RATE: 16 BRPM

## 2022-11-03 DIAGNOSIS — Z00.00 ENCOUNTER FOR GENERAL ADULT MEDICAL EXAMINATION WITHOUT ABNORMAL FINDINGS: ICD-10-CM

## 2022-11-03 PROCEDURE — 92134 CPTRZ OPH DX IMG PST SGM RTA: CPT

## 2022-11-03 PROCEDURE — G0463: CPT

## 2022-11-03 PROCEDURE — 99024 POSTOP FOLLOW-UP VISIT: CPT

## 2022-11-04 DIAGNOSIS — N50.819 TESTICULAR PAIN, UNSPECIFIED: ICD-10-CM

## 2022-11-04 DIAGNOSIS — N50.3 CYST OF EPIDIDYMIS: ICD-10-CM

## 2022-11-04 NOTE — HISTORY OF PRESENT ILLNESS
[FreeTextEntry1] : epididymal cyst [de-identified] : 57M h/o epididymal cyst here for f/u on testicular pain. Patient reports pain is controlled. He has urology referral on November 16th at [facility], he brought in written instructions given to him for the appointment. He has no new symptoms or concerns at this time.

## 2022-11-15 ENCOUNTER — APPOINTMENT (OUTPATIENT)
Dept: FAMILY MEDICINE | Facility: HOSPITAL | Age: 57
End: 2022-11-15

## 2022-11-21 ENCOUNTER — APPOINTMENT (OUTPATIENT)
Dept: FAMILY MEDICINE | Facility: HOSPITAL | Age: 57
End: 2022-11-21

## 2022-11-30 DIAGNOSIS — N50.819 TESTICULAR PAIN, UNSPECIFIED: ICD-10-CM

## 2022-12-01 ENCOUNTER — APPOINTMENT (OUTPATIENT)
Dept: FAMILY MEDICINE | Facility: HOSPITAL | Age: 57
End: 2022-12-01

## 2022-12-05 ENCOUNTER — APPOINTMENT (OUTPATIENT)
Dept: OPHTHALMOLOGY | Facility: CLINIC | Age: 57
End: 2022-12-05

## 2022-12-07 DIAGNOSIS — Z20.822 CONTACT WITH AND (SUSPECTED) EXPOSURE TO COVID-19: ICD-10-CM

## 2022-12-07 DIAGNOSIS — Z86.39 PERSONAL HISTORY OF OTHER ENDOCRINE, NUTRITIONAL AND METABOLIC DISEASE: ICD-10-CM

## 2022-12-08 ENCOUNTER — OUTPATIENT (OUTPATIENT)
Dept: OUTPATIENT SERVICES | Facility: HOSPITAL | Age: 57
LOS: 1 days | End: 2022-12-08
Payer: MEDICAID

## 2022-12-08 ENCOUNTER — APPOINTMENT (OUTPATIENT)
Dept: FAMILY MEDICINE | Facility: HOSPITAL | Age: 57
End: 2022-12-08

## 2022-12-08 VITALS
SYSTOLIC BLOOD PRESSURE: 122 MMHG | RESPIRATION RATE: 17 BRPM | OXYGEN SATURATION: 98 % | WEIGHT: 142 LBS | TEMPERATURE: 98.2 F | BODY MASS INDEX: 24.37 KG/M2 | HEART RATE: 93 BPM | DIASTOLIC BLOOD PRESSURE: 76 MMHG

## 2022-12-08 DIAGNOSIS — Z00.00 ENCOUNTER FOR GENERAL ADULT MEDICAL EXAMINATION WITHOUT ABNORMAL FINDINGS: ICD-10-CM

## 2022-12-08 DIAGNOSIS — E11.65 TYPE 2 DIABETES MELLITUS WITH HYPERGLYCEMIA: ICD-10-CM

## 2022-12-08 DIAGNOSIS — N50.3 CYST OF EPIDIDYMIS: ICD-10-CM

## 2022-12-08 PROCEDURE — G0463: CPT

## 2022-12-08 RX ORDER — FLUOXETINE HYDROCHLORIDE 10 MG/1
10 CAPSULE ORAL
Qty: 30 | Refills: 5 | Status: COMPLETED | COMMUNITY
Start: 2019-05-13 | End: 2022-12-08

## 2022-12-08 RX ORDER — PREDNISOLONE ACETATE 10 MG/ML
1 SUSPENSION/ DROPS OPHTHALMIC
Qty: 5 | Refills: 0 | Status: ACTIVE | COMMUNITY
Start: 2022-11-03

## 2022-12-08 RX ORDER — OFLOXACIN 3 MG/ML
0.3 SOLUTION/ DROPS OPHTHALMIC
Qty: 5 | Refills: 0 | Status: ACTIVE | COMMUNITY
Start: 2022-09-28

## 2022-12-08 NOTE — HISTORY OF PRESENT ILLNESS
[FreeTextEntry8] : 56 yo M presents requesting paper work to be filled for upcoming urology estefani. Pt complains of persistent L testicular pain radiating to abd unchanged in intensity from last.

## 2022-12-12 DIAGNOSIS — N50.3 CYST OF EPIDIDYMIS: ICD-10-CM

## 2022-12-14 ENCOUNTER — APPOINTMENT (OUTPATIENT)
Dept: UROLOGY | Facility: CLINIC | Age: 57
End: 2022-12-14

## 2023-01-12 DIAGNOSIS — Z96.1 PRESENCE OF INTRAOCULAR LENS: ICD-10-CM

## 2023-01-12 DIAGNOSIS — Z01.818 ENCOUNTER FOR OTHER PREPROCEDURAL EXAMINATION: ICD-10-CM

## 2023-01-12 DIAGNOSIS — S80.12XA CONTUSION OF LEFT LOWER LEG, INITIAL ENCOUNTER: ICD-10-CM

## 2023-01-13 ENCOUNTER — APPOINTMENT (OUTPATIENT)
Dept: FAMILY MEDICINE | Facility: HOSPITAL | Age: 58
End: 2023-01-13

## 2023-01-14 NOTE — ED ADULT NURSE NOTE - DATE/TIME OF ACCEPTANCE
Subjective   Melida Gabriel is a 62 year old right handed, noncompliant,  female.with (problems 1-7)  that presents for an annual exam  Labs have not been completed  Colonoscopy has not been scheduled  Fell today on right knee has applying ice  Right ear decline in hearing for approx 6 months  Failed to see podiatrist in 2021 re: right great toe pain  .      ---------------------------------------  1)HTN: has a home BP monitor. Readings average 125-130/80-70. Denies chest pain, SOB, MUNOZ.  2)H/O iron deficiency anemia: MV has iron in it  . Most recent H/H was 12.2/38.9(Feb2021) 11.4/38.6(Jan2019); 12.3/37.8 (Jan 2018)  3)Vit D deficiency: takes 5000 IU of vit D daily.  Most recent level was  76.6(Feb2021)82(Jan 2019); 80 (JAN 2018)  4)venous insufficiency: does not have knee high compression.  5)environmental allergy: currently asymptomatic. Uses zyrtec and flonase as needed  6)bilateral buinion  7)H/O covid    Oct 2021  8)overweight BMI 27.01%     Annual exam: 1/5/18; 1/8/19;10/2/20; 11/9/21; 1/25/23    Prevention  Pap : (Dr Montgomery) 6/22/18 (P) ;12/11/19 (G); pending 12/10/22  Mammogram:  9/18/14; 7/14/18; 1/20/20; 2/10/21 ;7/15/22  Bone density: 1/20/20 (nl)  Colonoscopy:  7/14/10/Dilma Bermudez) due  Hept C screen: 12/11/19    Immunizations  Influenza --- Series1: 05-Sep-2011; Series2: 17-Nov-2014; Series3: 15-Sep-2017 ; 12/15/18; 11/15/19;10/2/20; 11/9/21  PPSV --- Series1: 15-Sep-2017   Prevnar 13: 11/15/19  Tdap --- Series1: 07-Sep-2013   PPD: BCG vaccine (unable to take PPD screen)  Shingles vaccine--6/24/22 (part 1/2)  covid pfizer:  2/27/21 and 3/20/21 and 11/11/2 due    Consultants/Patient Care Team       Review of Systems   Constitutional: Negative for activity change, appetite change, fatigue and unexpected weight change.   HENT: Negative for congestion, ear pain, facial swelling, hearing loss, nosebleeds, sinus pressure, sore throat, trouble swallowing and voice change.    Eyes:  Negative for discharge, redness and visual disturbance.   Respiratory: Negative for cough, shortness of breath and wheezing.    Cardiovascular: Negative for chest pain, palpitations and leg swelling.   Gastrointestinal: Negative for abdominal pain, blood in stool and rectal pain.   Endocrine: Negative for cold intolerance, heat intolerance, polydipsia, polyphagia and polyuria.   Genitourinary: Negative for difficulty urinating, frequency, hematuria, pelvic pain, urgency, vaginal bleeding and vaginal discharge.   Musculoskeletal: Negative for arthralgias, gait problem and myalgias.        See HPI   Skin: Negative for color change and rash.   Allergic/Immunologic: Negative for food allergies.   Neurological: Negative for tremors, syncope, speech difficulty and headaches.   Hematological: Does not bruise/bleed easily.   Psychiatric/Behavioral: Negative for confusion, hallucinations and suicidal ideas. The patient is not nervous/anxious.        ALLERGIES:   Allergen Reactions   • Oxycodone-Acetaminophen Other (See Comments)       Current Outpatient Medications   Medication Sig Dispense Refill   • hydroCHLOROthiazide (HYDRODIURIL) 12.5 MG tablet Take 1 tablet by mouth daily. 30 tablet 0   • lisinopril-hydroCHLOROthiazide (ZESTORETIC) 10-12.5 MG per tablet Take 1 tablet by mouth daily. 30 tablet 1   • fluticasone (FLONASE) 50 MCG/ACT nasal spray Spray 2 sprays in each nostril daily. 16 g 0   • Multiple Vitamins-Minerals (MULTIVITAMIN ADULT PO)      • Cholecalciferol 125 mcg (5,000 units) capsule        No current facility-administered medications for this visit.       Patient Active Problem List   Diagnosis   • Osteoporosis screening   • Breast cancer screening by mammogram   • Over weight   • Varicose veins of both lower extremities   • Vitamin D deficiency   • Iron deficiency anemia   • Environmental allergies   • Bunion, right   • Non-compliant behavior   • Benign essential hypertension   • Annual physical exam   •  Acute pain of right knee   • Ecchymosis   • Change in hearing of right ear   • Pain in toe of right foot      Surgical History  Tonsillectomy:   Ovarian Wedge Resection:---2/2 cyst  Csection  and   Tubal Ligation:    Lysis Of Intestinal Adhesions:        Dr Moran  Colonoscopy: 7/14/10  Dilation And Curettage Re: PMB:   2015  (Dr Francis)     Family History  Father: glaucoma, CHF, HLD, HTN  Mother:  breast cancer  MGM:  cancer, dementia  MGF: , DM  PGM:  breast cancer  PGF: , prostate cancer  Brother x1:(Zachary III) HTN, asthma       Social History    Resides in [facility]  Retired: hospital administration [employer]  Two children: (Dulce, Dwayne)  DME: none  Alcohol use : 4 drinks/month  Caffeine use:  3 cups of coffee per day  Does not use illicit drugs   Former smoker: smoked for 2 years during adolescence  Exercise: walks daily for 45 minutes  Travel to [place] countries, [place], [place], [place] countries, [place]  POA completed 18 (daughter Dulce Lara)    Review  Patient's medications, allergies, past medical, surgical, social and family histories were reviewed and updated as appropriate.           Objective  Visit Vitals  BP (!) 138/92 (BP Location: LUE - Left upper extremity, Patient Position: Sitting)   Pulse 79   Temp 97.6 °F (36.4 °C)   Resp 19   Ht 5' 6\" (1.676 m)   Wt 75.9 kg (167 lb 5.3 oz)   SpO2 98%   BMI 27.01 kg/m²     Physical Exam  Vitals and nursing note reviewed.   Constitutional:       Appearance: She is well-developed.   HENT:      Head: Normocephalic and atraumatic.      Right Ear: Decreased hearing noted.      Ears:      Comments: Right TM--scarring  Eyes:      Conjunctiva/sclera: Conjunctivae normal.   Cardiovascular:      Rate and Rhythm: Normal rate and regular rhythm.      Heart sounds: Normal heart sounds.   Pulmonary:      Breath sounds: Normal breath sounds.   Chest:   Breasts:     Right: No inverted  nipple, nipple discharge or tenderness.      Left: No inverted nipple, nipple discharge or tenderness.   Abdominal:      General: Bowel sounds are normal.      Palpations: Abdomen is soft.      Comments: Upper midline post op scar   Musculoskeletal:         General: Normal range of motion.      Cervical back: Normal range of motion and neck supple.      Right knee: Ecchymosis present. Tenderness present.   Skin:     General: Skin is warm and dry.   Neurological:      Mental Status: She is alert and oriented to person, place, and time.      Deep Tendon Reflexes: Reflexes are normal and symmetric.   Psychiatric:         Behavior: Behavior normal.         Judgment: Judgment normal.         Assessment and Plan  Problem List Items Addressed This Visit     Vitamin D deficiency    Bunion, right    Relevant Orders    SERVICE TO PODIATRY    Benign essential hypertension - Primary    Relevant Medications    hydroCHLOROthiazide (HYDRODIURIL) 12.5 MG tablet    lisinopril-hydroCHLOROthiazide (ZESTORETIC) 10-12.5 MG per tablet    Annual physical exam    Acute pain of right knee    Ecchymosis    Change in hearing of right ear    Relevant Orders    SERVICE TO ENT    Pain in toe of right foot    Relevant Orders    SERVICE TO PODIATRY     Right knee pain /ecchymosis after a fall--continue to ice the knee and use tylenol as needed  Right ear change in hearing  Right toe pain will refer again to [facility]    1)blood pressure --elevated will add and additional 12.5 mg of hydrochlorothiazide  2)Vit D deficiency-recommend decreasing from 5000 IU daily to 2000 IU daily  3)environmental allergy-continue to monitor          ---------------------------------------------------------------------  Return for BP check within 30 days with office nurse Jimmy  Please complete 12 hour fasting labs and submit stool card  I recommended ENT evaluation with audiology test (referred to Lakshmi)  Another referral entered for Dr Negron  Screening colonoscopy is  recommended    Referral has been entered for Dr Chandler or associate  Covid booster is  recommended   See me in 3 mo      Follow up  Return in about 3 months (around 4/25/2023).    Jeremy Culver MD   16-Sep-2019 00:30

## 2023-10-12 ENCOUNTER — EMERGENCY (EMERGENCY)
Facility: HOSPITAL | Age: 58
LOS: 1 days | Discharge: HOME | End: 2023-10-12
Attending: EMERGENCY MEDICINE | Admitting: EMERGENCY MEDICINE
Payer: MEDICAID

## 2023-10-12 ENCOUNTER — OUTPATIENT (OUTPATIENT)
Dept: OUTPATIENT SERVICES | Facility: HOSPITAL | Age: 58
LOS: 1 days | End: 2023-10-12
Payer: MEDICAID

## 2023-10-12 ENCOUNTER — NON-APPOINTMENT (OUTPATIENT)
Age: 58
End: 2023-10-12

## 2023-10-12 ENCOUNTER — APPOINTMENT (OUTPATIENT)
Dept: FAMILY MEDICINE | Facility: HOSPITAL | Age: 58
End: 2023-10-12

## 2023-10-12 VITALS
OXYGEN SATURATION: 97 % | DIASTOLIC BLOOD PRESSURE: 71 MMHG | BODY MASS INDEX: 23.86 KG/M2 | TEMPERATURE: 97.9 F | WEIGHT: 139 LBS | RESPIRATION RATE: 16 BRPM | HEART RATE: 73 BPM | SYSTOLIC BLOOD PRESSURE: 107 MMHG

## 2023-10-12 VITALS
SYSTOLIC BLOOD PRESSURE: 118 MMHG | DIASTOLIC BLOOD PRESSURE: 78 MMHG | RESPIRATION RATE: 18 BRPM | OXYGEN SATURATION: 98 % | HEART RATE: 60 BPM

## 2023-10-12 VITALS
OXYGEN SATURATION: 98 % | HEART RATE: 72 BPM | DIASTOLIC BLOOD PRESSURE: 75 MMHG | SYSTOLIC BLOOD PRESSURE: 116 MMHG | RESPIRATION RATE: 18 BRPM | HEIGHT: 67 IN | WEIGHT: 138.89 LBS | TEMPERATURE: 97 F

## 2023-10-12 DIAGNOSIS — Z00.00 ENCOUNTER FOR GENERAL ADULT MEDICAL EXAMINATION WITHOUT ABNORMAL FINDINGS: ICD-10-CM

## 2023-10-12 PROCEDURE — 99284 EMERGENCY DEPT VISIT MOD MDM: CPT

## 2023-10-12 PROCEDURE — G0463: CPT

## 2023-10-12 PROCEDURE — 99283 EMERGENCY DEPT VISIT LOW MDM: CPT

## 2023-10-12 RX ORDER — OFLOXACIN 0.3 %
2 DROPS OPHTHALMIC (EYE) ONCE
Refills: 0 | Status: COMPLETED | OUTPATIENT
Start: 2023-10-12 | End: 2023-10-12

## 2023-10-12 RX ADMIN — Medication 2 DROP(S): at 12:26

## 2023-10-12 NOTE — ED PROVIDER NOTE - ATTENDING APP SHARED VISIT CONTRIBUTION OF CARE
Keegan with ASHLEY Bal. pt 59 yo m c/o right eye redness, intermittent pain and blurry vision for 4 days. pt sent from  clinic for evaluation. does have a hx of b/l cataract sx 1 year ago  denies trauma, headache, foreign body. taking motrin and OTC drops prn. no contacts  Head: NC/AT, LEFT EYE: EOMI, no swelling. scleral injection. tonopen: 8,8,7. fluorescein stain uptake 9oclock on cornea. no FB. no siedel sign  will give ophthalmic abx drops.  clinical coordinator to schedule outpt ophtho appt for patient.   Discussed with patient need to return to ED if symptoms don't continue to improve or recur or develops any new or worsening symptoms that are of concern.    I have discussed the patient's plan of care with PA/NP/Resident. I agree with note as stated above.  This includes History of Present Illness, HIV, Past Medical/Surgical/Family/Social History, Allergies and Home Medications, Review of Systems, Physical Exam, and any Progress Notes during the time I functioned as the attending physician for this patient.

## 2023-10-12 NOTE — ED PROVIDER NOTE - PHYSICAL EXAMINATION
Gen: Well appearing in NAD  Head: NC/AT, LEFT EYE: EOMI, no swelling. scleral injection. tonopen: 8,8,7. fluorescin stain uptake 9oclock on cornea. no FB. no sidel sign  Neck: trachea midline  Resp:  No distress  Ext: no deformities  Neuro:  A&O appears non focal  Skin:  Warm and dry as visualized  Psych:  Normal affect and mood

## 2023-10-12 NOTE — ED PROVIDER NOTE - NSCAREINITIATED _GEN_ER
Neena Yates) Detail Level: Zone Render Risk Assessment In Note?: no Note Text (......Xxx Chief Complaint.): This diagnosis correlates with the Other (Free Text): Treatment Number: 1\\nLesions Injected: 1\\nMedication Injected: 10.0 mg/cc of Kenalog\\nTotal Volume Injected: 0.3cc\\nAdministered by: Dr. Parveen Simmons\\n\\nThe risks of atrophy were reviewed with the patient. This procedure was medically necessary because the lesions that were treated were: inflamed.

## 2023-10-12 NOTE — ED PROVIDER NOTE - NSFOLLOWUPINSTRUCTIONS_ED_ALL_ED_FT
Follow up with the ophthalmologist   Use eye drops 3 drops left eye 3 times a day for 5 days  Please use 650mg tylenol (or acetaminophen) every 6 hours and 600mg motrin (or advil or ibuprofen) every 6 hours as needed for pain/discomfort/swelling.  Make sure not to use more than 3500mg in any 24 hour period.       Abrasión corneal  Corneal Abrasion    Marshal abrasión corneal es un rasguño o lesión en la cubierta transparente en la parte delantera del sivan (córnea). [place] puede ser doloroso. Es importante recibir tratamiento para la abrasión corneal. Si maria teresa problema no se trata, puede afectar la vista (visión).    ¿Cuáles son las causas?  Un golpe o pinchazo en el sivan.  Un objeto en el sivan.  Frotar demasiado los ojos.  Ojos muy secos.  Ciertas infecciones oculares.  Lentes de contacto que no se ajustan de manera adecuada o que se usan shirley mucho tiempo. También puede lastimarse la córnea cuando se pone o se ramy los lentes de contacto.  Cirugía ocular.  Ciertos problemas de la córnea pueden aumentar la probabilidad de marshal abrasión corneal.  A veces, la causa es desconocida.    ¿Cuáles son los signos o síntomas?  Dolor en el sivan. El dolor puede empeorar al abrir y cerrar el sivan, o al moverlo.  Sensación de que tiene algo metido en el sivan.  Lagrimeo, enrojecimiento y sensibilidad a la gloria.  Dificultad para mantener los ojos abiertos o imposibilidad de mantenerlos abiertos.  Visión borrosa.  Dolor de cornelio.  ¿Cómo se diagnostica?  Puede consultar a un médico especialista en afecciones y enfermedades oculares (oftalmólogo). Esta afección se puede diagnosticar en función de los síntomas, antecedentes médicos y un examen ocular.    ¿Cómo se trata?  Lavado del sivan.  Quitar todo lo que esté introducido en el sivan.  Usar gotas o ungüentos con antibiótico para tratar o prevenir marshal infección.  Usar marshal gota dilatadora para disminuir la irritación, la hinchazón y el dolor.  Usar gotas o pomadas con corticoesteroides para tratar el enrojecimiento, la irritación o la inflamación.  Aplicar un paño frío y húmedo (compresa fría) o compresa de hielo para aliviar el dolor  Steve analgésicos por vía oral.  En algunos casos, también podría usarse un parche ocular o lentes de contacto blandos. No debe usarse un parche ocular si la abrasión corneal tuvo relación con el uso de lentes de contacto, ya que puede aumentar la probabilidad de infección en estos ojos.    Siga estas instrucciones en bernal casa:  Medicamentos    Use ungüentos o gotas para los ojos justin se lo haya indicado el médico.  Si le recetaron gotas o ungüentos con antibiótico, úselos según las indicaciones del médico. No deje de usar el antibiótico aunque comience a sentirse mejor.  Use los medicamentos de venta valentín y los recetados solamente justin se lo haya indicado el médico.  Consulte a bernal médico si el medicamento que le recetaron:  Hace necesario que evite conducir o usar maquinaria pesada.  Puede causarle dificultad para defecar (estreñimiento). Es posible que deba steve medidas para prevenir o tratar los problemas para defecar:  Beber suficiente líquido para mantener el pis (la orina) de color amarillo pálido.  Usar medicamentos recetados o de venta valentín.  [place] alimentos ricos en fibra. Entre ellos, frijoles, cereales integrales y frutas y verduras frescas.  Limitar los alimentos con alto contenido de grasas y azúcares procesados. Estos incluyen alimentos fritos o dulces.  Usar un parche ocular    Si tiene un parche ocular, debe usarlo según las indicaciones del médico.  No conduzca ni use maquinaria mientras usa el parche ocular.  Siga las instrucciones del médico acerca de cuándo retirar el parche.  Instrucciones generales    Pregúntele al médico si puede usar un paño frío y húmedo sobre el sivan a fin de ayudar a aliviar el dolor.  No se toque ni se frote el sivan. No se lave el sivan.  No use lentes de contacto hasta que el médico lo autorice.  Evite las luces brillantes.  Evite la fatiga ocular.  Concurra a todas las visitas de seguimiento justin se lo haya indicado el médico. Hacer esto puede ayudar a prevenir marshal infección y la pérdida de la visión.  Comuníquese con un médico si:  Continúa con dolor ocular y otros síntomas shirley más de 2 días.  Tiene síntomas nuevos, justin más enrojecimiento, ojos llorosos o secreción.  Tiene marshal secreción que le stephanie los ojos pegados a la mañana.  El parche ocular se afloja al punto que puede parpadear.  Los síntomas vuelven a aparecer después de que el sivan se haya curado.  Solicite ayuda inmediatamente si:  Tiene dolor muy intenso en el sivan y no se jina con medicamentos.  Tiene pérdida de la visión.  Resumen  Marshal abrasión corneal es un rasguño o lesión en la cubierta transparente en la parte delantera del sivan (córnea).  Es importante recibir tratamiento para la abrasión corneal. Si maria teresa problema no se trata, puede afectar la vista (visión).  Use ungüentos o gotas para los ojos justin se lo haya indicado el médico.  Si tiene un parche ocular, no conduzca ni use maquinaria mientras lo usa.  Informe al médico si los síntomas childers más de 2 días.  Esta información no tiene justin fin reemplazar el consejo del médico. Asegúrese de hacerle al médico cualquier pregunta que tenga.

## 2023-10-12 NOTE — ED ADULT NURSE REASSESSMENT NOTE - NS ED NURSE REASSESS COMMENT FT1
visual acuity test performed by Beverly TEIXEIRA. L- eye 20/40, R-eye 20/25, both 20/25. will continue to monitor

## 2023-10-12 NOTE — ED PROVIDER NOTE - PATIENT PORTAL LINK FT
You can access the FollowMyHealth Patient Portal offered by [facility] by registering at the following website: [email or web address]. By joining [employer]’s FollowMyHealth portal, you will also be able to view your health information using other applications (apps) compatible with our system.

## 2023-10-12 NOTE — ED PROVIDER NOTE - NS ED ATTENDING STATEMENT MOD
This was a shared visit with the SERGO. I reviewed and verified the documentation and independently performed the documented:

## 2023-10-12 NOTE — ED ADULT TRIAGE NOTE - PAIN RATING/NUMBER SCALE (0-10): ACTIVITY
5 (moderate pain) You can access the FollowMyHealth Patient Portal offered by [facility] by registering at the following website: [email or web address]. By joining [employer]s FollowMyHealth portal, you will also be able to view your health information using other applications (apps) compatible with our system.

## 2023-10-12 NOTE — ED ADULT NURSE NOTE - NSFALLUNIVINTERV_ED_ALL_ED
Bed/Stretcher in lowest position, wheels locked, appropriate side rails in place/Call bell, personal items and telephone in reach/Instruct patient to call for assistance before getting out of bed/chair/stretcher/Non-slip footwear applied when patient is off stretcher/[place] to call system/Physically safe environment - no spills, clutter or unnecessary equipment/Purposeful proactive rounding/Room/bathroom lighting operational, light cord in reach

## 2023-10-12 NOTE — ED ADULT NURSE NOTE - PRO INTERPRETER NEED 2
This note was copied from a baby's chart  Information on hand expression given  Discussed benefits of knowing how to manually express breast including stimulating milk supply, softening nipple for latch and evacuating breast in the event of engorgement  Worked on positioning infant up at chest level and starting to feed infant with nose arriving at the nipple  Then, using areolar compression to achieve a deep latch that is comfortable and exchanges optimum amounts of milk  Deep latch for short sucks on right breast using football hold for multiple attempts  Deep latch and stimulate to suck on left breast for about 5 minutes using football hold  Dad supportive at bedside  Encouraged parents to call for assistance, questions, and concerns about breastfeeding  Extension provided  Indian

## 2023-10-12 NOTE — ED PROVIDER NOTE - CLINICAL SUMMARY MEDICAL DECISION MAKING FREE TEXT BOX
pt 57 yo m c/o right eye redness, intermittent pain and blurry vision for 4 days. pt sent from  clinic for evaluation. does have a hx of b/l cataract sx 1 year ago  denies trauma, headache, foreign body. taking motrin and OTC drops prn. no contacts  Head: NC/AT, LEFT EYE: EOMI, no swelling. scleral injection. tonopen: 8,8,7. fluorescin stain uptake 9oclock on cornea. no FB. no sidel sign  will give abx eye   clinical coordinator to schedule eye drVero lloyd for pt  Discussed with patient need to return to ED if symptoms don't continue to improve or recur or develops any new or worsening symptoms that are of concern.

## 2023-10-12 NOTE — ED ADULT NURSE NOTE - OBJECTIVE STATEMENT
pt comes from home c/o L-eye pain. pt reports onset 5 days ago. associated symptoms of blurry vision. denies any trauma or chemicals in eye, denies use of contacts. pt reports hx b/l cataract sx 1yr ago. denies any fevers, cp, sob

## 2023-10-12 NOTE — ED PROVIDER NOTE - OBJECTIVE STATEMENT
pt 57 yo m c/o right eye redness, intermittent pain and blurry vision for 4 days. pt sent from  clinic for evaluation. does have a hx of b/l cataract sx 1 year ago  denies trauma, headache, foreign body. taking motrin and OTC drops prn. no contacts

## 2023-10-12 NOTE — ED ADULT TRIAGE NOTE - CHIEF COMPLAINT QUOTE
C/o pain and redness to left eye x 5 days. Pt endorses some blurry vision he associates it with the irritation. Denies injury or known foreign body to eye. Pt with h/o bilateral cataract surgery.

## 2023-10-14 DIAGNOSIS — H57.89 OTHER SPECIFIED DISORDERS OF EYE AND ADNEXA: ICD-10-CM

## 2023-10-19 ENCOUNTER — APPOINTMENT (OUTPATIENT)
Dept: OPHTHALMOLOGY | Facility: CLINIC | Age: 58
End: 2023-10-19
Payer: MEDICAID

## 2023-10-19 ENCOUNTER — NON-APPOINTMENT (OUTPATIENT)
Age: 58
End: 2023-10-19

## 2023-10-19 PROCEDURE — 92134 CPTRZ OPH DX IMG PST SGM RTA: CPT

## 2023-10-19 PROCEDURE — 92014 COMPRE OPH EXAM EST PT 1/>: CPT

## 2023-10-25 ENCOUNTER — APPOINTMENT (OUTPATIENT)
Dept: FAMILY MEDICINE | Facility: HOSPITAL | Age: 58
End: 2023-10-25

## 2023-10-25 ENCOUNTER — OUTPATIENT (OUTPATIENT)
Dept: OUTPATIENT SERVICES | Facility: HOSPITAL | Age: 58
LOS: 1 days | End: 2023-10-25
Payer: MEDICAID

## 2023-10-25 VITALS
OXYGEN SATURATION: 98 % | SYSTOLIC BLOOD PRESSURE: 112 MMHG | TEMPERATURE: 98.2 F | DIASTOLIC BLOOD PRESSURE: 75 MMHG | RESPIRATION RATE: 14 BRPM | HEART RATE: 62 BPM | WEIGHT: 140 LBS | BODY MASS INDEX: 24.03 KG/M2

## 2023-10-25 DIAGNOSIS — Z00.00 ENCOUNTER FOR GENERAL ADULT MEDICAL EXAMINATION WITHOUT ABNORMAL FINDINGS: ICD-10-CM

## 2023-10-25 DIAGNOSIS — H20.9 UNSPECIFIED IRIDOCYCLITIS: ICD-10-CM

## 2023-10-25 DIAGNOSIS — H57.89 OTHER SPECIFIED DISORDERS OF EYE AND ADNEXA: ICD-10-CM

## 2023-10-25 PROCEDURE — G0463: CPT

## 2023-10-25 RX ORDER — NAPROXEN 250 MG/1
250 TABLET ORAL
Qty: 14 | Refills: 0 | Status: DISCONTINUED | COMMUNITY
Start: 2022-10-12 | End: 2023-10-25

## 2023-10-25 RX ORDER — METRONIDAZOLE 7.5 MG/G
0.75 CREAM TOPICAL TWICE DAILY
Qty: 1 | Refills: 0 | Status: ACTIVE | COMMUNITY
Start: 2021-10-28 | End: 1900-01-01

## 2023-10-25 RX ORDER — PREDNISOLONE ACETATE 10 MG/ML
1 SUSPENSION/ DROPS OPHTHALMIC 4 TIMES DAILY
Qty: 1 | Refills: 0 | Status: ACTIVE | COMMUNITY
Start: 2023-10-25 | End: 1900-01-01

## 2023-10-26 DIAGNOSIS — H20.9 UNSPECIFIED IRIDOCYCLITIS: ICD-10-CM

## 2023-10-26 DIAGNOSIS — H57.89 OTHER SPECIFIED DISORDERS OF EYE AND ADNEXA: ICD-10-CM

## 2023-11-02 ENCOUNTER — NON-APPOINTMENT (OUTPATIENT)
Age: 58
End: 2023-11-02

## 2023-11-02 ENCOUNTER — APPOINTMENT (OUTPATIENT)
Dept: OPHTHALMOLOGY | Facility: CLINIC | Age: 58
End: 2023-11-02
Payer: MEDICAID

## 2023-11-02 PROCEDURE — 92012 INTRM OPH EXAM EST PATIENT: CPT

## 2023-11-29 ENCOUNTER — APPOINTMENT (OUTPATIENT)
Dept: OPHTHALMOLOGY | Facility: CLINIC | Age: 58
End: 2023-11-29
Payer: MEDICAID

## 2023-11-29 ENCOUNTER — NON-APPOINTMENT (OUTPATIENT)
Age: 58
End: 2023-11-29

## 2023-11-29 PROCEDURE — 92134 CPTRZ OPH DX IMG PST SGM RTA: CPT

## 2023-11-29 PROCEDURE — 92012 INTRM OPH EXAM EST PATIENT: CPT

## 2023-11-30 RX ORDER — CYCLOPENTOLATE HYDROCHLORIDE 5 MG/ML
0.5 SOLUTION/ DROPS OPHTHALMIC TWICE DAILY
Qty: 1 | Refills: 0 | Status: ACTIVE | COMMUNITY
Start: 2023-10-25 | End: 1900-01-01

## 2023-12-27 ENCOUNTER — NON-APPOINTMENT (OUTPATIENT)
Age: 58
End: 2023-12-27

## 2023-12-27 ENCOUNTER — APPOINTMENT (OUTPATIENT)
Dept: OPHTHALMOLOGY | Facility: CLINIC | Age: 58
End: 2023-12-27
Payer: MEDICAID

## 2023-12-27 PROCEDURE — 92012 INTRM OPH EXAM EST PATIENT: CPT

## 2023-12-27 PROCEDURE — 92134 CPTRZ OPH DX IMG PST SGM RTA: CPT

## 2024-03-06 ENCOUNTER — NON-APPOINTMENT (OUTPATIENT)
Age: 59
End: 2024-03-06

## 2024-03-06 ENCOUNTER — APPOINTMENT (OUTPATIENT)
Dept: OPHTHALMOLOGY | Facility: CLINIC | Age: 59
End: 2024-03-06
Payer: MEDICAID

## 2024-03-06 PROCEDURE — 92012 INTRM OPH EXAM EST PATIENT: CPT

## 2024-04-15 ENCOUNTER — NON-APPOINTMENT (OUTPATIENT)
Age: 59
End: 2024-04-15

## 2024-04-15 ENCOUNTER — APPOINTMENT (OUTPATIENT)
Dept: OPHTHALMOLOGY | Facility: CLINIC | Age: 59
End: 2024-04-15
Payer: COMMERCIAL

## 2024-04-15 PROCEDURE — 92134 CPTRZ OPH DX IMG PST SGM RTA: CPT

## 2024-04-15 PROCEDURE — 92012 INTRM OPH EXAM EST PATIENT: CPT

## 2024-06-10 ENCOUNTER — NON-APPOINTMENT (OUTPATIENT)
Age: 59
End: 2024-06-10

## 2024-06-10 RX ORDER — METFORMIN HYDROCHLORIDE 1000 MG/1
1000 TABLET, COATED ORAL TWICE DAILY
Qty: 60 | Refills: 3 | Status: ACTIVE | COMMUNITY
Start: 2022-05-09 | End: 1900-01-01

## 2024-06-10 RX ORDER — SYRINGE AND NEEDLE,INSULIN,1ML 31GX15/64"
31G X 15/64" SYRINGE, EMPTY DISPOSABLE MISCELLANEOUS
Qty: 2 | Refills: 3 | Status: ACTIVE | COMMUNITY
Start: 2021-05-14 | End: 1900-01-01

## 2024-06-19 ENCOUNTER — RESULT CHARGE (OUTPATIENT)
Age: 59
End: 2024-06-19

## 2024-06-20 ENCOUNTER — OUTPATIENT (OUTPATIENT)
Dept: OUTPATIENT SERVICES | Facility: HOSPITAL | Age: 59
LOS: 1 days | End: 2024-06-20
Payer: SELF-PAY

## 2024-06-20 ENCOUNTER — APPOINTMENT (OUTPATIENT)
Dept: FAMILY MEDICINE | Facility: HOSPITAL | Age: 59
End: 2024-06-20

## 2024-06-20 VITALS
DIASTOLIC BLOOD PRESSURE: 72 MMHG | HEART RATE: 79 BPM | WEIGHT: 131 LBS | OXYGEN SATURATION: 98 % | RESPIRATION RATE: 14 BRPM | TEMPERATURE: 97.8 F | SYSTOLIC BLOOD PRESSURE: 115 MMHG | BODY MASS INDEX: 22.49 KG/M2

## 2024-06-20 DIAGNOSIS — L60.0 INGROWING NAIL: ICD-10-CM

## 2024-06-20 DIAGNOSIS — J02.9 ACUTE PHARYNGITIS, UNSPECIFIED: ICD-10-CM

## 2024-06-20 DIAGNOSIS — E11.49 TYPE 2 DIABETES MELLITUS WITH OTHER DIABETIC NEUROLOGICAL COMPLICATION: ICD-10-CM

## 2024-06-20 DIAGNOSIS — Z00.00 ENCOUNTER FOR GENERAL ADULT MEDICAL EXAMINATION WITHOUT ABNORMAL FINDINGS: ICD-10-CM

## 2024-06-20 LAB
BILIRUB UR QL STRIP: NORMAL
CHOLEST SERPL-MCNC: 235 MG/DL
CLARITY UR: CLEAR
COLLECTION METHOD: NORMAL
CREAT SPEC-SCNC: 46 MG/DL
GLUCOSE BLDC GLUCOMTR-MCNC: 339
GLUCOSE BLDC GLUCOMTR-MCNC: 339 MG/DL — HIGH (ref 70–99)
GLUCOSE BLDC GLUCOMTR-MCNC: 468 MG/DL — CRITICAL HIGH (ref 70–99)
GLUCOSE BLDC GLUCOMTR-MCNC: 473 MG/DL — CRITICAL HIGH (ref 70–99)
GLUCOSE BLDC GLUCOMTR-MCNC: 478
GLUCOSE UR-MCNC: NORMAL
HBA1C MFR BLD HPLC: 14
HCG UR QL: 0.2 EU/DL
HDLC SERPL-MCNC: 50 MG/DL
HGB UR QL STRIP.AUTO: NORMAL
KETONES UR-MCNC: NORMAL
LDLC SERPL CALC-MCNC: 154 MG/DL
LEUKOCYTE ESTERASE UR QL STRIP: NORMAL
MICROALBUMIN 24H UR DL<=1MG/L-MCNC: <1.2 MG/DL
MICROALBUMIN/CREAT 24H UR-RTO: NORMAL MG/G
NITRITE UR QL STRIP: NORMAL
NONHDLC SERPL-MCNC: 185 MG/DL
PH UR STRIP: 6
PROT UR STRIP-MCNC: NORMAL
SP GR UR STRIP: 1.01
TRIGL SERPL-MCNC: 174 MG/DL

## 2024-06-20 PROCEDURE — G0463: CPT

## 2024-06-20 PROCEDURE — 82962 GLUCOSE BLOOD TEST: CPT

## 2024-06-20 PROCEDURE — 82043 UR ALBUMIN QUANTITATIVE: CPT

## 2024-06-20 PROCEDURE — 80061 LIPID PANEL: CPT

## 2024-06-20 RX ORDER — HUMAN INSULIN 100 [IU]/ML
(70-30) 100 INJECTION, SUSPENSION SUBCUTANEOUS TWICE DAILY
Qty: 1 | Refills: 3 | Status: ACTIVE | COMMUNITY
Start: 2024-06-20 | End: 1900-01-01

## 2024-06-20 RX ORDER — PEN NEEDLE, DIABETIC 32GX 5/32"
32G X 4 MM NEEDLE, DISPOSABLE MISCELLANEOUS
Qty: 1 | Refills: 0 | Status: ACTIVE | COMMUNITY
Start: 2024-06-20 | End: 1900-01-01

## 2024-06-20 RX ORDER — HUMAN INSULIN 100 [IU]/ML
(70-30) 100 INJECTION, SUSPENSION SUBCUTANEOUS TWICE DAILY
Qty: 2 | Refills: 8 | Status: DISCONTINUED | COMMUNITY
Start: 2021-03-17 | End: 2024-06-20

## 2024-06-20 RX ADMIN — SODIUM CHLORIDE 1000 %: 9 INJECTION, SOLUTION INTRAVENOUS at 00:00

## 2024-06-20 NOTE — PHYSICAL EXAM
[No Acute Distress] : no acute distress [Well-Appearing] : well-appearing [Normal Sclera/Conjunctiva] : normal sclera/conjunctiva [EOMI] : extraocular movements intact [Normal Outer Ear/Nose] : the outer ears and nose were normal in appearance [Normal Oropharynx] : the oropharynx was normal [No Lymphadenopathy] : no lymphadenopathy [Supple] : supple [Pedal Pulses Present] : the pedal pulses are present [No Edema] : there was no peripheral edema [No Extremity Clubbing/Cyanosis] : no extremity clubbing/cyanosis [No Spinal Tenderness] : no spinal tenderness [No Joint Swelling] : no joint swelling [Grossly Normal Strength/Tone] : grossly normal strength/tone [Normal Gait] : normal gait [Normal] : affect was normal and insight and judgment were intact [None] : no ulcers in either foot were found [] : both feet

## 2024-06-20 NOTE — HISTORY OF PRESENT ILLNESS
[FreeTextEntry8] : 58M h/o DM2, bilateral cataract surgery in 2022 presents for med renewal, sore throat. - Pt c/o sore throat for 3wks. Denies fever, LAD, runny nose, sinusitis, cough, SOB, chest pain, sick contacts. Overall symptoms are improving. - Pt taking diabetes medications as prescribed; novolin 20U morning and evening, metformin 1000mg bid. Patient does not check BG at home. Denies hypoglycemic events. Patient reports nocturia as much as 6x/night. No sensory deficits, paresthesias, although c/o ingrown toenail that he tried to cut himself causing bleeding. No foot ulcers or swelling.

## 2024-06-21 ENCOUNTER — RESULT CHARGE (OUTPATIENT)
Age: 59
End: 2024-06-21

## 2024-06-21 ENCOUNTER — APPOINTMENT (OUTPATIENT)
Dept: FAMILY MEDICINE | Facility: HOSPITAL | Age: 59
End: 2024-06-21

## 2024-06-21 ENCOUNTER — OUTPATIENT (OUTPATIENT)
Dept: OUTPATIENT SERVICES | Facility: HOSPITAL | Age: 59
LOS: 1 days | End: 2024-06-21
Payer: SELF-PAY

## 2024-06-21 VITALS
OXYGEN SATURATION: 100 % | TEMPERATURE: 98.3 F | BODY MASS INDEX: 22.66 KG/M2 | RESPIRATION RATE: 14 BRPM | WEIGHT: 132 LBS | HEART RATE: 87 BPM | DIASTOLIC BLOOD PRESSURE: 68 MMHG | SYSTOLIC BLOOD PRESSURE: 106 MMHG

## 2024-06-21 DIAGNOSIS — E11.40 TYPE 2 DIABETES MELLITUS WITH DIABETIC NEUROPATHY, UNSPECIFIED: ICD-10-CM

## 2024-06-21 DIAGNOSIS — Z00.00 ENCOUNTER FOR GENERAL ADULT MEDICAL EXAMINATION WITHOUT ABNORMAL FINDINGS: ICD-10-CM

## 2024-06-21 DIAGNOSIS — E11.49 TYPE 2 DIABETES MELLITUS WITH OTHER DIABETIC NEUROLOGICAL COMPLICATION: ICD-10-CM

## 2024-06-21 LAB
GLUCOSE BLDC GLUCOMTR-MCNC: 279
GLUCOSE BLDC GLUCOMTR-MCNC: 279 MG/DL — HIGH (ref 70–99)

## 2024-06-21 PROCEDURE — G0463: CPT

## 2024-06-21 PROCEDURE — 82962 GLUCOSE BLOOD TEST: CPT

## 2024-06-21 PROCEDURE — 81003 URINALYSIS AUTO W/O SCOPE: CPT

## 2024-06-21 RX ORDER — FLASH GLUCOSE SENSOR
KIT MISCELLANEOUS
Qty: 2 | Refills: 3 | Status: DISCONTINUED | COMMUNITY
Start: 2021-03-17 | End: 2024-06-21

## 2024-06-21 RX ORDER — BLOOD-GLUCOSE SENSOR
EACH MISCELLANEOUS
Qty: 2 | Refills: 5 | Status: ACTIVE | COMMUNITY
Start: 2024-06-21 | End: 1900-01-01

## 2024-06-21 RX ORDER — BLOOD SUGAR DIAGNOSTIC
STRIP MISCELLANEOUS
Qty: 1 | Refills: 3 | Status: ACTIVE | COMMUNITY
Start: 2019-01-25

## 2024-06-21 RX ORDER — SIMVASTATIN 20 MG/1
20 TABLET, FILM COATED ORAL
Qty: 90 | Refills: 3 | Status: ACTIVE | COMMUNITY
Start: 2019-05-13 | End: 1900-01-01

## 2024-06-21 NOTE — HISTORY OF PRESENT ILLNESS
[FreeTextEntry1] : f/u DM2 [de-identified] : 58M h/o uncontrolled DM2 (14% 6/20/24), here for f/u on DM2. He was here 6/20 yesterday. As per note review: Eats high carbs. Does not check CBGs. A1c of 14 and . UA negative for ketones. Given IVF 1L bolus in clinic. Was provided sample bhavna monitor to be removed after 14 days. Amenable to switching to insulin pen, instructed on use with increased dose 24U up from 20. Referred to podiatry.    Today he reports he feels much better today compared to the last few weeks. He is taking medications as prescribed, picked up Novolin pen and is taking 24 units BID. Has Bhavna monitor in place, logs reviewed. Denies increased thirst, hunger, or urination. He states he is motivated to continue taking his medications because he has so much more energy to day than he has had over the last several weeks.

## 2024-06-21 NOTE — PHYSICAL EXAM
[Normal] : soft, non-tender, non-distended, no masses palpated, no HSM and normal bowel sounds [Normal Gait] : normal gait [Normal Affect] : the affect was normal [Right Foot Was Examined] : Right foot ~C was examined [Left Foot Was Examined] : left foot ~C was examined [None] : no ulcers in either foot were found [] : both feet

## 2024-06-22 DIAGNOSIS — E11.49 TYPE 2 DIABETES MELLITUS WITH OTHER DIABETIC NEUROLOGICAL COMPLICATION: ICD-10-CM

## 2024-06-22 DIAGNOSIS — E11.40 TYPE 2 DIABETES MELLITUS WITH DIABETIC NEUROPATHY, UNSPECIFIED: ICD-10-CM

## 2024-06-22 LAB — KETONES URINE: NEGATIVE MG/DL

## 2024-07-03 ENCOUNTER — APPOINTMENT (OUTPATIENT)
Dept: PODIATRY | Facility: HOSPITAL | Age: 59
End: 2024-07-03
Payer: SELF-PAY

## 2024-07-03 ENCOUNTER — OUTPATIENT (OUTPATIENT)
Dept: OUTPATIENT SERVICES | Facility: HOSPITAL | Age: 59
LOS: 1 days | End: 2024-07-03
Payer: SELF-PAY

## 2024-07-03 VITALS
WEIGHT: 135 LBS | DIASTOLIC BLOOD PRESSURE: 75 MMHG | OXYGEN SATURATION: 98 % | SYSTOLIC BLOOD PRESSURE: 130 MMHG | TEMPERATURE: 97.8 F | BODY MASS INDEX: 23.17 KG/M2 | RESPIRATION RATE: 15 BRPM | HEART RATE: 63 BPM

## 2024-07-03 DIAGNOSIS — E11.40 TYPE 2 DIABETES MELLITUS WITH DIABETIC NEUROPATHY, UNSPECIFIED: ICD-10-CM

## 2024-07-03 DIAGNOSIS — E11.49 TYPE 2 DIABETES MELLITUS WITH OTHER DIABETIC NEUROLOGICAL COMPLICATION: ICD-10-CM

## 2024-07-03 DIAGNOSIS — M79.672 PAIN IN LEFT FOOT: ICD-10-CM

## 2024-07-03 DIAGNOSIS — L60.0 INGROWING NAIL: ICD-10-CM

## 2024-07-03 DIAGNOSIS — M79.609 PAIN IN UNSPECIFIED LIMB: ICD-10-CM

## 2024-07-03 PROCEDURE — 99203 OFFICE O/P NEW LOW 30 MIN: CPT

## 2024-07-03 PROCEDURE — G0463: CPT

## 2024-07-05 ENCOUNTER — APPOINTMENT (OUTPATIENT)
Dept: FAMILY MEDICINE | Facility: HOSPITAL | Age: 59
End: 2024-07-05

## 2024-07-05 ENCOUNTER — OUTPATIENT (OUTPATIENT)
Dept: OUTPATIENT SERVICES | Facility: HOSPITAL | Age: 59
LOS: 1 days | End: 2024-07-05
Payer: SELF-PAY

## 2024-07-05 VITALS
OXYGEN SATURATION: 99 % | WEIGHT: 142 LBS | SYSTOLIC BLOOD PRESSURE: 115 MMHG | HEIGHT: 64 IN | DIASTOLIC BLOOD PRESSURE: 70 MMHG | HEART RATE: 65 BPM | TEMPERATURE: 98 F | BODY MASS INDEX: 24.24 KG/M2 | RESPIRATION RATE: 15 BRPM

## 2024-07-05 DIAGNOSIS — E11.65 TYPE 2 DIABETES MELLITUS WITH HYPERGLYCEMIA: ICD-10-CM

## 2024-07-05 DIAGNOSIS — E78.5 HYPERLIPIDEMIA, UNSPECIFIED: ICD-10-CM

## 2024-07-05 DIAGNOSIS — Z00.00 ENCOUNTER FOR GENERAL ADULT MEDICAL EXAMINATION WITHOUT ABNORMAL FINDINGS: ICD-10-CM

## 2024-07-05 PROCEDURE — 82947 ASSAY GLUCOSE BLOOD QUANT: CPT

## 2024-07-05 PROCEDURE — G0463: CPT

## 2024-07-05 RX ORDER — PEN NEEDLE, DIABETIC 33 GX5/32"
33G X 4 MM NEEDLE, DISPOSABLE MISCELLANEOUS
Qty: 1 | Refills: 1 | Status: ACTIVE | COMMUNITY
Start: 2024-07-05 | End: 1900-01-01

## 2024-07-05 RX ORDER — INSULIN GLARGINE 100 [IU]/ML
100 INJECTION, SOLUTION SUBCUTANEOUS
Qty: 1 | Refills: 1 | Status: ACTIVE | COMMUNITY
Start: 2024-07-05 | End: 1900-01-01

## 2024-07-05 RX ORDER — ATORVASTATIN CALCIUM 20 MG/1
20 TABLET, FILM COATED ORAL
Qty: 90 | Refills: 0 | Status: ACTIVE | COMMUNITY
Start: 2024-07-05 | End: 1900-01-01

## 2024-07-07 LAB — GLUCOSE BLDC GLUCOMTR-MCNC: 211

## 2024-07-24 ENCOUNTER — APPOINTMENT (OUTPATIENT)
Dept: PODIATRY | Facility: HOSPITAL | Age: 59
End: 2024-07-24

## 2024-08-05 ENCOUNTER — APPOINTMENT (OUTPATIENT)
Dept: FAMILY MEDICINE | Facility: HOSPITAL | Age: 59
End: 2024-08-05

## 2024-08-07 NOTE — HISTORY OF PRESENT ILLNESS
[Most Recent A1C: ___] : Most recent A1C was [unfilled] [Regular podiatrist visits] : Patient had regular podiatrist visits [Target A1C:  ___] : Target A1C is [unfilled] [Target goal not met] : A1C target goal not met [Moderate Intensity] : Patient is currently on moderate intensity statin  therapy [de-identified] : 58M h/o uncontrolled DM2 presents for follow up. has valentín COLLINS

## 2024-08-08 ENCOUNTER — APPOINTMENT (OUTPATIENT)
Dept: FAMILY MEDICINE | Facility: HOSPITAL | Age: 59
End: 2024-08-08

## 2024-08-09 ENCOUNTER — APPOINTMENT (OUTPATIENT)
Dept: FAMILY MEDICINE | Facility: HOSPITAL | Age: 59
End: 2024-08-09

## 2024-08-09 ENCOUNTER — OUTPATIENT (OUTPATIENT)
Dept: OUTPATIENT SERVICES | Facility: HOSPITAL | Age: 59
LOS: 1 days | End: 2024-08-09
Payer: SELF-PAY

## 2024-08-09 DIAGNOSIS — Z00.00 ENCOUNTER FOR GENERAL ADULT MEDICAL EXAMINATION WITHOUT ABNORMAL FINDINGS: ICD-10-CM

## 2024-08-09 PROBLEM — Z59.89 UNINSURED: Status: ACTIVE | Noted: 2024-08-09

## 2024-08-09 PROBLEM — Z79.4 LONG TERM (CURRENT) USE OF INSULIN: Status: ACTIVE | Noted: 2024-08-09

## 2024-08-09 PROCEDURE — 83036 HEMOGLOBIN GLYCOSYLATED A1C: CPT

## 2024-08-09 PROCEDURE — G0463: CPT

## 2024-08-09 NOTE — HISTORY OF PRESENT ILLNESS
[FreeTextEntry1] : PT ARRIVED TODAY WITHOUT SCHEDULED APPOINTMENT - MISSED APPT W/ ME ON 8/5/24 AND DR BARTH 8/8/24 STATES HE IS WORRIED ABOUT FINANCES AND NOT WORKING MUCH AND FEELS WORRIED SO HE FORGETS HIS APPTS  FEELS WELL. DENIES POLYURIA, POLYDIPSIA OR UNINTENTIONAL WEIGHT LOSS SINCE LAST VISIT  PT HAS NO INSURANCE AND IS UNABLE TO OBTAIN RECEIVING BASAGLAR, METFORMIN AND ATORVASTATIN FROM [facility] FOR NO-COST MEDS.  .REPORTS COMPLIANCE W/ METFORMIN 1,000 MG BEFORE BREAKFAST AND DINNER REPORTS COMPLIANCE W/ BASAGLAR 36  UNITS DAILY DOES NOT TEST BG REGULARLY AND DECLINED OFFER OF SAMPLE CGM UNABLE TO COOK WHERE HE LIVES AND GETS ALL MEALS OUT DRINKS ONLY WATER  ORIGINALLY FROM \[facility]\"".  REPORTS 6TH GRADE EDUCATION.  REPORTS BEING ABLE TO READ IN [place]ian WALKS 1 HOUR EVERY DAY

## 2024-08-09 NOTE — ASSESSMENT
[FreeTextEntry1] : A1C TODAY 8.4% MUCH IMPROVED DISCUSSED GENERAL A1C GOAL OF 7% AND BG GOALS  BEFORE MEALS AND < 180 2 HOURS AFTER MEALS TO HELP REDUCE INCIDENCE OF COMPLICATIONS   TREATMENT OPTIONS DISCUSSED TO HELP GET A1C CLOSER TO GOAL - GLP1 vs INTENSIFYING LIFESTYLE CHANGES .  RISKS/BENEFITS AND SIDE EFFECT PROFILE EXPLAINED AND DISCUSSED.  PT OPTED FOR GLPT1 INSTRUCTED ON USE OF TRULICITY.    WILL START 0.75 MG WEEKLY x 4 WEEKS.  IF TOLERATING, WILL INCREASE TO 1.5 MG DENIES ANY PERSONAL OR FAMILY HX OF THYROID CANCER OR PANCREATITIS INSTRUCTED TO STORE PENS IN [place].  RECOMMEND THE DAY BEFORE OR DAY OF TO HAVE AT ROOM TEMP EXPLAINED THERE MAY BE SOME NAUSEA ASSOCIATED W/ THIS MEDICATION BUT USUALLY LESSENS IN TIME EDUCATED ON POTENTIAL SIDE EFFECTS.   INSTRUCTED TO REPORT ANY LUMP OR SWELLING IN THE NECK, HOARSENESS, TROUBLE SWALLOWING OR SHORTNESS OF BREATH, INTOLERABLE GI SIDE EFFECTS OR ABDOMINAL PAIN.  EDUCATED ON IMPORTANCE OF ADEQUATE HYDRATION AND NOT TAKING TRULICITY IF UNABLE TO EAT OR DRINK INSTRUCTED ON IMPORTANCE OF STOPPING MEDICATION  AND CALLING MD PROMPTLY.  TRULICITY SHOULD BE HELD x 1 WEEK FPRIOR TO PROCEEDURES REQUIRING SEDATION MEDICATION WILL COME FROM [facility] AND BE DELIVERED TO THIS OFFICE.  PT WILL COME IN FOR INSTRUCTION WHEN MED ARRIVES INSTRUCTED TO CONTINUE CURRENT REGIME - MADE AWARE METFORMIN WILL NOW   MG RATHER THAN 1,000 MG DUE TO AVAILABILITY IN [facility] PROGRAM.   PT IN AGREEMENT DECLINED OFFER OF SAMPLE CGM INSTRUCTED TO TEST BG BEFORE BREAKFAST AND DINNER AND RECORD ON LOG SHEETS .INSTRUCTED TO CALL OFFICE FOR BG < 70, > 250 OR FOR ANY QUESTIONS OR CONCERNS WAS ABLE TO "TEACH BACK" ALL INSTRUCTIONS SCHEDULED TO RETURN TO OFFICE ON 9/3/24

## 2024-08-09 NOTE — REVIEW OF SYSTEMS
[Fatigue] : no fatigue [Blurred Vision] : no blurred vision [Dysphagia] : no dysphagia [Palpitations] : no palpitations [Shortness Of Breath] : no shortness of breath [Nausea] : no nausea [Diarrhea] : no diarrhea [Gas/Bloating] : no gas/bloating [Polyuria] : no polyuria [Joint Pain] : no joint pain [Polydipsia] : no polydipsia

## 2024-08-09 NOTE — REASON FOR VISIT
[Interpreters_IDNumber] : [id number] [Interpreters_FullName] : OBED [TWNoteComboBox1] : Czech [FreeTextEntry2] : DR BARTH

## 2024-08-10 DIAGNOSIS — Z79.4 LONG TERM (CURRENT) USE OF INSULIN: ICD-10-CM

## 2024-08-10 DIAGNOSIS — Z59.89 OTHER PROBLEMS RELATED TO HOUSING AND ECONOMIC CIRCUMSTANCES: ICD-10-CM

## 2024-08-10 DIAGNOSIS — E11.65 TYPE 2 DIABETES MELLITUS WITH HYPERGLYCEMIA: ICD-10-CM

## 2024-08-10 DIAGNOSIS — Z55.0 ILLITERACY AND LOW-LEVEL LITERACY: ICD-10-CM

## 2024-08-10 DIAGNOSIS — E78.5 HYPERLIPIDEMIA, UNSPECIFIED: ICD-10-CM

## 2024-08-10 DIAGNOSIS — E71.40 DISORDER OF CARNITINE METABOLISM, UNSPECIFIED: ICD-10-CM

## 2024-08-10 SDOH — ECONOMIC STABILITY - INCOME SECURITY: OTHER PROBLEMS RELATED TO HOUSING AND ECONOMIC CIRCUMSTANCES: Z59.89

## 2024-08-10 SDOH — EDUCATIONAL SECURITY - EDUCATION ATTAINMENT: ILITERACY AND LOW LEVEL LITERACY: Z55.0

## 2024-08-15 ENCOUNTER — APPOINTMENT (OUTPATIENT)
Dept: OPHTHALMOLOGY | Facility: CLINIC | Age: 59
End: 2024-08-15

## 2024-08-30 ENCOUNTER — APPOINTMENT (OUTPATIENT)
Dept: FAMILY MEDICINE | Facility: HOSPITAL | Age: 59
End: 2024-08-30

## 2024-08-30 DIAGNOSIS — Z59.89 OTHER PROBLEMS RELATED TO HOUSING AND ECONOMIC CIRCUMSTANCES: ICD-10-CM

## 2024-08-30 DIAGNOSIS — E78.5 HYPERLIPIDEMIA, UNSPECIFIED: ICD-10-CM

## 2024-08-30 DIAGNOSIS — Z55.0 ILLITERACY AND LOW-LEVEL LITERACY: ICD-10-CM

## 2024-08-30 DIAGNOSIS — E11.40 TYPE 2 DIABETES MELLITUS WITH DIABETIC NEUROPATHY, UNSPECIFIED: ICD-10-CM

## 2024-08-30 DIAGNOSIS — L60.0 INGROWING NAIL: ICD-10-CM

## 2024-08-30 DIAGNOSIS — E11.65 TYPE 2 DIABETES MELLITUS WITH HYPERGLYCEMIA: ICD-10-CM

## 2024-08-30 DIAGNOSIS — Z79.4 LONG TERM (CURRENT) USE OF INSULIN: ICD-10-CM

## 2024-08-30 DIAGNOSIS — Z55.6 PROBLEMS RELATED TO HEALTH LITERACY: ICD-10-CM

## 2024-08-30 SDOH — EDUCATIONAL SECURITY - EDUCATION ATTAINMENT: PROBLEMS RELATED TO HEALTH LITERACY: Z55.6

## 2024-08-30 SDOH — EDUCATIONAL SECURITY - EDUCATION ATTAINMENT: ILITERACY AND LOW LEVEL LITERACY: Z55.0

## 2024-08-30 SDOH — ECONOMIC STABILITY - INCOME SECURITY: OTHER PROBLEMS RELATED TO HOUSING AND ECONOMIC CIRCUMSTANCES: Z59.89

## 2024-08-30 NOTE — ASSESSMENT
[Long Term Vascular Complications] : long term vascular complications of diabetes [Carbohydrate Consistent Diet] : carbohydrate consistent diet [Importance of Diet and Exercise] : importance of diet and exercise to improve glycemic control, achieve weight loss and improve cardiovascular health [Exercise/Effect on Glucose] : exercise/effect on glucose [Hypoglycemia Management] : hypoglycemia management [Self Monitoring of Blood Glucose] : self monitoring of blood glucose [Injection Technique, Storage, Sharps Disposal] : injection technique, storage, and sharps disposal [FreeTextEntry1] : DISCUSSED GENERAL A1C GOAL OF 7% AND BG GOALS  BEFORE MEALS AND < 180 2 HOURS AFTER MEALS TO HELP REDUCE INCIDENCE OF COMPLICATIONS  SAMPLE FLOR 3 CGM PLACED ON  UPPER ARM LOT#  [id number] SERIAL #[id number]    EXP:10/31/24 INSTRUCTED TO AVOID MORE THAN 500 MG OF VITAMIN C (PT DOES NOT TAKE) INSTRUCTED TO REMOVE PRIOR TO ANY X-RAY, MRI OR CT SCAN EXPLAINED THAT CGM IS TESTING INTERSTITIAL FLUID, RATHER THAN THE CAPILLARY BLOOD MEASURED WHEN PT PERFORMS A FINGERSTICK INSTRUCTED TO VERIFY ANY RESULTS THAT SEEM INACCURATE OR IF HAVING FEELINGS OF LOW BG BY PERFORMING FINGERSTICK EDUCATED ON SIGNS AND SYMPTOMS OF SITE INFECTION AND IMPORTANCE OF REMOVING SENSOR AND CALLING OFFICE ASAP INSTRUCTED TO REMOVE SENSOR AFTER 14 DAYS.  SENSOR MAY BE DISCARDED.   .INSTRUCTED TO CALL OFFICE FOR BG < 70, > 250 OR FOR ANY QUESTIONS OR CONCERNS  EDUCATED ON SIGNS AND SYMPTOMS OF INFECTION OF LEFT GREAT TOESNAIL INFECTION AND IMPORTANCE OF CALLING OFFICE IMMEDIATELY FOR ANY ISSUES PT VERBALIZED UNDERSTANDING  PT AGREED TO BRING 1 TRULICITY PEN (PICTURE TAKEN ON HIS PHONE) TO MEET W/ MOSES MANDUJANO LMSW, [facility] ON 9/3/24 FOR TRULICITY INSTRUCTION.  INSULIN WILL BE ADJUSTED AT THAT TIME AND FOLLOW UP APPT MADE

## 2024-08-30 NOTE — PHYSICAL EXAM
[Alert] : alert [No Acute Distress] : no acute distress [No Respiratory Distress] : no respiratory distress [Normal Gait] : normal gait [Foot Ulcers] : no foot ulcers [Right foot was examined, including] : right foot ~C was examined, including visual inspection with sensory and pulse exams [Left foot was examined, including] : left foot ~C was examined, including visual inspection with sensory and pulse exams [Normal] : normal [2+] : 2+ in the dorsalis pedis [Diminished Throughout Both Feet] : normal tactile sensation with monofilament testing throughout both feet [FreeTextEntry6] : OPENING FROM INGROWN NAIL REMOVAL FROM PODIATRY ON GREAT TOE MEDIAL ASPECT

## 2024-08-30 NOTE — REVIEW OF SYSTEMS
[Fatigue] : no fatigue [Blurred Vision] : no blurred vision [Dysphagia] : no dysphagia [Palpitations] : no palpitations [Shortness Of Breath] : no shortness of breath [Nausea] : no nausea [Diarrhea] : no diarrhea [Gas/Bloating] : no gas/bloating [Polyuria] : no polyuria [Polydipsia] : no polydipsia

## 2024-08-30 NOTE — REASON FOR VISIT
[Follow - Up] : a follow-up visit [DM Type 2] : DM Type 2 [[employer] ] : provided by [employer]   [Time Spent: ____ minutes] : Total time spent using  services: [unfilled] minutes. The patient's primary language is not English thus required  services. [Interpreters_IDNumber] : [id number] [Interpreters_FullName] : KAYLIN [FreeTextEntry2] : DR BARTH [TWNoteComboBox1] : Ecuadorean

## 2024-08-30 NOTE — HISTORY OF PRESENT ILLNESS
[FreeTextEntry1] : HERE TODAY FOR DIABETES FOLLOW UP AND TRULICITY EDUCATION AND INJECTION FEELS WELL DENIES POLYURIA, POLYDIPSIA OR UNINTENTIONAL WEIGHT LOSS  PT HAS NO INSURANCE AND IS UNABLE TO OBTAIN RECEIVING BASAGLAR, METFORMIN. TRULICITY AND ATORVASTATIN FROM [facility] FOR NO-COST MEDS.  .REPORTS COMPLIANCE W/ METFORMIN 850 MG BEFORE BREAKFAST AND DINNER REPORTS COMPLIANCE W/ BASAGLAR 36 UNITS DAILY DID NOT BRING TRULICITY TO APPT FOR INSTRUCTION - DIDN'T THINK IT WAS DELIVERED -PHARMACY CONFIRMED DELIVERY ON 8/13.  PT STATES HE PUT DELIVERY IN [place] DOES NOT TEST BG REGULARLY  UNABLE TO COOK WHERE HE LIVES AND GETS ALL MEALS OUT -  WILL BE MOVING TO [place] AND [facility] RECEIVE DELIVERIES THERE.  MEDS TO BE DELIVERED TO OUR OFFICE WHERE HE WILL  DRINKS ONLY WATER  ORIGINALLY FROM [facility]. REPORTS 6TH GRADE EDUCATION. REPORTS BEING ABLE TO READ IN [place]i WALKS 1 HOUR EVERY DAY VERY POOR HEALTH LITERACY AND NAVIGATION AS WELL AS TIMLINESS FOR APPTS WENT TO [facility] AND DID NOT RECEIVE CARE BECAUSE HE WAS UNWILLING TO SIGN FINANCIAL RESPONSIBILITY FORMS

## 2024-09-03 ENCOUNTER — APPOINTMENT (OUTPATIENT)
Dept: FAMILY MEDICINE | Facility: HOSPITAL | Age: 59
End: 2024-09-03

## 2024-09-03 ENCOUNTER — NON-APPOINTMENT (OUTPATIENT)
Age: 59
End: 2024-09-03

## 2024-09-18 ENCOUNTER — APPOINTMENT (OUTPATIENT)
Dept: PODIATRY | Facility: HOSPITAL | Age: 59
End: 2024-09-18
Payer: COMMERCIAL

## 2024-09-18 VITALS
TEMPERATURE: 97.6 F | SYSTOLIC BLOOD PRESSURE: 114 MMHG | DIASTOLIC BLOOD PRESSURE: 77 MMHG | HEIGHT: 62.99 IN | RESPIRATION RATE: 16 BRPM | HEART RATE: 72 BPM | OXYGEN SATURATION: 99 % | WEIGHT: 140 LBS | BODY MASS INDEX: 24.8 KG/M2

## 2024-09-18 DIAGNOSIS — L60.0 INGROWING NAIL: ICD-10-CM

## 2024-09-18 PROCEDURE — 99212 OFFICE O/P EST SF 10 MIN: CPT

## 2024-09-18 NOTE — HISTORY OF PRESENT ILLNESS
[FreeTextEntry1] : pt presents to clinic refered by internal medicine doctor for diabetic foot check and preventative care. pt states he has L foot hallux pain on the nail from incurvated nail edges. Pt had ingrown nail removed last visit and finished his PO antibiotics. Pt denies pus or redness. no N/V/F/SOB   ID [id number]  Pt states he does not know his last A1c  FBS 9/18 120

## 2024-09-18 NOTE — PHYSICAL EXAM
[2+] : left foot dorsalis pedis 2+ [Normal Foot/Ankle] : Both lower extremities were exposed and visualized. Standing exam demonstrates normal foot posture and alignment. Hindfoot exam shows no hindfoot valgus or varus [Skin Color & Pigmentation] : normal skin color and pigmentation [Oriented To Time, Place, And Person] : oriented to person, place, and time

## 2024-09-18 NOTE — ASSESSMENT
[FreeTextEntry1] : 58M presents for routine diabetic preventative care - PT seen and evaluated  - B/l feet, No Open wounds or signs of infection  - Aseptic debridement on b/; digit nails 1-5 - Educated patient on proper foot hygiene, controlling sugars and daily foot checks  - RTC in 3 months

## 2024-09-25 ENCOUNTER — MED ADMIN CHARGE (OUTPATIENT)
Age: 59
End: 2024-09-25

## 2024-09-25 ENCOUNTER — APPOINTMENT (OUTPATIENT)
Dept: FAMILY MEDICINE | Facility: HOSPITAL | Age: 59
End: 2024-09-25

## 2024-09-25 VITALS
HEART RATE: 62 BPM | BODY MASS INDEX: 25.69 KG/M2 | SYSTOLIC BLOOD PRESSURE: 124 MMHG | TEMPERATURE: 97.7 F | WEIGHT: 145 LBS | RESPIRATION RATE: 16 BRPM | DIASTOLIC BLOOD PRESSURE: 76 MMHG | OXYGEN SATURATION: 98 %

## 2024-09-25 DIAGNOSIS — Z23 ENCOUNTER FOR IMMUNIZATION: ICD-10-CM

## 2024-09-25 DIAGNOSIS — Z12.11 ENCOUNTER FOR SCREENING FOR MALIGNANT NEOPLASM OF COLON: ICD-10-CM

## 2024-09-25 DIAGNOSIS — E11.65 TYPE 2 DIABETES MELLITUS WITH HYPERGLYCEMIA: ICD-10-CM

## 2024-09-26 NOTE — HISTORY OF PRESENT ILLNESS
[FreeTextEntry1] : CSP- FIT [de-identified] : Patient is a 58yo M presenting for CSP- colon cancer screening.  Denies family history of colon cancer. Denies abnormal colonoscopies, blood in stool, unintentional weight loss, or stool changes. Patient is at average risk for colon cancer.

## 2024-09-26 NOTE — INTERPRETER SERVICES
[Interpreters_IDNumber] : [id number] [Interpreters_FullName] : Helen [TWNoteComboBox1] : [place] Islander

## 2024-09-26 NOTE — HISTORY OF PRESENT ILLNESS
[FreeTextEntry1] : CSP- FIT [de-identified] : Patient is a 58yo M presenting for CSP- colon cancer screening.  Denies family history of colon cancer. Denies abnormal colonoscopies, blood in stool, unintentional weight loss, or stool changes. Patient is at average risk for colon cancer.

## 2024-09-26 NOTE — PHYSICAL EXAM
[Normal Sclera/Conjunctiva] : normal sclera/conjunctiva [EOMI] : extraocular movements intact [Normal Outer Ear/Nose] : the outer ears and nose were normal in appearance [Normal] : no respiratory distress, lungs were clear to auscultation bilaterally and no accessory muscle use [Normal Rate] : normal rate  [Regular Rhythm] : with a regular rhythm [Soft] : abdomen soft [Non Tender] : non-tender [Non-distended] : non-distended [Normal Affect] : the affect was normal [Normal Insight/Judgement] : insight and judgment were intact

## 2024-09-26 NOTE — REVIEW OF SYSTEMS
[Nausea] : nausea [Nocturia] : nocturia [Negative] : Psychiatric [Vomiting] : no vomiting [Dysuria] : no dysuria [Frequency] : no frequency

## 2024-09-26 NOTE — HISTORY OF PRESENT ILLNESS
[FreeTextEntry1] : CSP- FIT [de-identified] : Patient is a 58yo M presenting for CSP- colon cancer screening.  Denies family history of colon cancer. Denies abnormal colonoscopies, blood in stool, unintentional weight loss, or stool changes. Patient is at average risk for colon cancer.

## 2024-09-27 LAB — HEMOCCULT STL QL IA: NEGATIVE

## 2024-11-27 NOTE — ED PROVIDER NOTE - NSCAREINITIATED _GEN_ER
Mckinley Castro(Attending) [FreeTextEntry1] : Here for annual physical examination today. [de-identified] : Sarah is a pleasant 54-year-old gentleman with history of hypercholesterolemia, BPH, vitamin D deficiency, he came in for annual physical examination today.  SARAH is overall feeling well at this time; no respiratory complaints and denies of having any chest pain, bowel movement or urination problems, or abdominal pain.

## 2025-02-23 NOTE — H&P PST ADULT - NSICDXFAMILYHX_GEN_ALL_CORE_FT
0 (no pain/absence of nonverbal indicators of pain) FAMILY HISTORY:  No pertinent family history in first degree relatives

## 2025-04-10 NOTE — ED ADULT TRIAGE NOTE - PAIN: PRESENCE, MLM
Quality 130: Documentation Of Current Medications In The Medical Record: Current Medications Documented Quality 431: Preventive Care And Screening: Unhealthy Alcohol Use - Screening: Patient not identified as an unhealthy alcohol user when screened for unhealthy alcohol use using a systematic screening method Quality 226: Preventive Care And Screening: Tobacco Use: Screening And Cessation Intervention: Patient screened for tobacco use and is an ex/non-smoker Detail Level: Detailed complains of pain/discomfort

## (undated) DEVICE — GLV 6 PROTEXIS

## (undated) DEVICE — PACK OPTH CATARACT

## (undated) DEVICE — BLADE SHARP POINT 2.75MM

## (undated) DEVICE — WRAP COMPRESSION CALF MED

## (undated) DEVICE — KNIFE OPTH SHARPOINT CRESCENT 2MM

## (undated) DEVICE — SOL IRR BAL SALT + 500ML

## (undated) DEVICE — CAPSULE GUARD I/A

## (undated) DEVICE — TIP MICRO KELMAN TAPR 0.9MM 30DEG

## (undated) DEVICE — CARTRIDGE D MONARCH III N/C

## (undated) DEVICE — DRSG TEGADERM 2.5X3"

## (undated) DEVICE — BLANKET WARMER LOWER ADULT

## (undated) DEVICE — BAG BSS 500ML

## (undated) DEVICE — NDL REROBULBAR ATKINSON 23G X 1.5"

## (undated) DEVICE — KNFE VITREC 20G